# Patient Record
Sex: MALE | Race: WHITE | URBAN - METROPOLITAN AREA
[De-identification: names, ages, dates, MRNs, and addresses within clinical notes are randomized per-mention and may not be internally consistent; named-entity substitution may affect disease eponyms.]

---

## 2019-09-26 ENCOUNTER — OFFICE VISIT (OUTPATIENT)
Dept: INTERNAL MEDICINE CLINIC | Age: 39
End: 2019-09-26

## 2019-09-26 VITALS
WEIGHT: 209 LBS | HEART RATE: 70 BPM | SYSTOLIC BLOOD PRESSURE: 110 MMHG | RESPIRATION RATE: 18 BRPM | HEIGHT: 72 IN | BODY MASS INDEX: 28.31 KG/M2 | DIASTOLIC BLOOD PRESSURE: 75 MMHG

## 2019-09-26 DIAGNOSIS — K52.9 CHRONIC DIARRHEA: ICD-10-CM

## 2019-09-26 DIAGNOSIS — Z00.00 ANNUAL PHYSICAL EXAM: Primary | ICD-10-CM

## 2019-09-26 DIAGNOSIS — E78.00 HYPERCHOLESTEROLEMIA: ICD-10-CM

## 2019-09-26 PROCEDURE — 81002 URINALYSIS NONAUTO W/O SCOPE: CPT | Performed by: INTERNAL MEDICINE

## 2019-09-26 PROCEDURE — 99395 PREV VISIT EST AGE 18-39: CPT | Performed by: INTERNAL MEDICINE

## 2019-09-26 ASSESSMENT — ENCOUNTER SYMPTOMS
SORE THROAT: 0
EYE PAIN: 0
SHORTNESS OF BREATH: 0
CONSTIPATION: 0
NAUSEA: 0
ABDOMINAL PAIN: 0
DIARRHEA: 1
STRIDOR: 0
COUGH: 0

## 2019-09-26 ASSESSMENT — PATIENT HEALTH QUESTIONNAIRE - PHQ9
1. LITTLE INTEREST OR PLEASURE IN DOING THINGS: 0
SUM OF ALL RESPONSES TO PHQ9 QUESTIONS 1 & 2: 0
SUM OF ALL RESPONSES TO PHQ QUESTIONS 1-9: 0
SUM OF ALL RESPONSES TO PHQ QUESTIONS 1-9: 0
2. FEELING DOWN, DEPRESSED OR HOPELESS: 0

## 2019-09-26 NOTE — PROGRESS NOTES
Physically abused: Not on file     Forced sexual activity: Not on file   Other Topics Concern    Not on file   Social History Narrative    Not on file     Family History   Problem Relation Age of Onset    High Blood Pressure Father        Review of Systems   Constitutional: Negative for diaphoresis and fever. HENT: Negative for congestion, hearing loss, nosebleeds, sore throat and tinnitus. Eyes: Negative for pain. Respiratory: Negative for cough, shortness of breath and stridor. Cardiovascular: Negative for chest pain and palpitations. Gastrointestinal: Positive for diarrhea. Negative for abdominal pain, constipation and nausea. Endocrine: Negative for cold intolerance and heat intolerance. Genitourinary: Negative for dysuria and frequency. Musculoskeletal: Negative for arthralgias, myalgias and neck pain. Skin: Negative for rash. Allergic/Immunologic: Negative for environmental allergies. Neurological: Negative for tremors and headaches. Psychiatric/Behavioral: The patient is not nervous/anxious. All other systems reviewed and are negative. Objective:   Physical Exam   Vitals:    09/26/19 1636   BP: 110/75   Pulse: 70   Resp: 18         General: young healthy male,  Awake, alert and oriented. Appears to be not in any distress  Mucous Membranes:  Pink , anicteric  Mild hearing issues   HEENT - MM clear. TM normal. Pharynx clear  No Submandibular LN palpable  Neck: No JVD, no carotid bruit, no thyromegaly  Chest:  Clear to auscultation bilaterally, no added sounds  Cardiovascular:  RRR S1S2 heard, no murmurs or gallops  Abdomen:  Soft, undistended, non tender, no organomegaly, BS present  Extremities: No edema or cyanosis. Distal pulses well felt  Neurological : grossly normal CN 2 to 12 intact  Gait steady        Assessment:       Diagnosis Orders   1.  Annual physical exam  CBC WITH AUTO DIFFERENTIAL    COMPREHENSIVE METABOLIC PANEL    TSH with Reflex    Lipid, Fasting    POCT

## 2019-10-26 PROBLEM — Z00.00 ANNUAL PHYSICAL EXAM: Status: RESOLVED | Noted: 2019-09-26 | Resolved: 2019-10-26

## 2022-06-07 ENCOUNTER — NURSE ONLY (OUTPATIENT)
Dept: INTERNAL MEDICINE CLINIC | Age: 42
End: 2022-06-07

## 2022-06-07 DIAGNOSIS — Z00.00 ANNUAL PHYSICAL EXAM: Primary | ICD-10-CM

## 2022-06-07 LAB
A/G RATIO: 1.6 (ref 1.1–2.2)
ALBUMIN SERPL-MCNC: 4.6 G/DL (ref 3.4–5)
ALP BLD-CCNC: 67 U/L (ref 40–129)
ALT SERPL-CCNC: 18 U/L (ref 10–40)
ANION GAP SERPL CALCULATED.3IONS-SCNC: 13 MMOL/L (ref 3–16)
AST SERPL-CCNC: 20 U/L (ref 15–37)
BASOPHILS ABSOLUTE: 0.1 K/UL (ref 0–0.2)
BASOPHILS RELATIVE PERCENT: 1.1 %
BILIRUB SERPL-MCNC: 0.6 MG/DL (ref 0–1)
BILIRUBIN, POC: NORMAL
BLOOD URINE, POC: NORMAL
BUN BLDV-MCNC: 8 MG/DL (ref 7–20)
CALCIUM SERPL-MCNC: 9.6 MG/DL (ref 8.3–10.6)
CHLORIDE BLD-SCNC: 103 MMOL/L (ref 99–110)
CHOLESTEROL, FASTING: 219 MG/DL (ref 0–199)
CLARITY, POC: NORMAL
CO2: 25 MMOL/L (ref 21–32)
COLOR, POC: NORMAL
CREAT SERPL-MCNC: 0.9 MG/DL (ref 0.9–1.3)
EOSINOPHILS ABSOLUTE: 0.2 K/UL (ref 0–0.6)
EOSINOPHILS RELATIVE PERCENT: 3.9 %
GFR AFRICAN AMERICAN: >60
GFR NON-AFRICAN AMERICAN: >60
GLUCOSE BLD-MCNC: 99 MG/DL (ref 70–99)
GLUCOSE URINE, POC: NORMAL
HCT VFR BLD CALC: 42.7 % (ref 40.5–52.5)
HDLC SERPL-MCNC: 55 MG/DL (ref 40–60)
HEMOGLOBIN: 14.5 G/DL (ref 13.5–17.5)
IGA: 207 MG/DL (ref 70–400)
KETONES, POC: NORMAL
LDL CHOLESTEROL CALCULATED: 139 MG/DL
LEUKOCYTE EST, POC: NORMAL
LYMPHOCYTES ABSOLUTE: 1.8 K/UL (ref 1–5.1)
LYMPHOCYTES RELATIVE PERCENT: 32.6 %
MCH RBC QN AUTO: 31.1 PG (ref 26–34)
MCHC RBC AUTO-ENTMCNC: 33.9 G/DL (ref 31–36)
MCV RBC AUTO: 91.8 FL (ref 80–100)
MONOCYTES ABSOLUTE: 0.4 K/UL (ref 0–1.3)
MONOCYTES RELATIVE PERCENT: 7.4 %
NEUTROPHILS ABSOLUTE: 3 K/UL (ref 1.7–7.7)
NEUTROPHILS RELATIVE PERCENT: 55 %
NITRITE, POC: NORMAL
PDW BLD-RTO: 13.5 % (ref 12.4–15.4)
PH, POC: NORMAL
PLATELET # BLD: 222 K/UL (ref 135–450)
PMV BLD AUTO: 9.5 FL (ref 5–10.5)
POTASSIUM SERPL-SCNC: 4.4 MMOL/L (ref 3.5–5.1)
PROTEIN, POC: NORMAL
RBC # BLD: 4.65 M/UL (ref 4.2–5.9)
SODIUM BLD-SCNC: 141 MMOL/L (ref 136–145)
SPECIFIC GRAVITY, POC: NORMAL
TOTAL PROTEIN: 7.4 G/DL (ref 6.4–8.2)
TRIGLYCERIDE, FASTING: 127 MG/DL (ref 0–150)
TSH REFLEX: 1.19 UIU/ML (ref 0.27–4.2)
UROBILINOGEN, POC: NORMAL
VLDLC SERPL CALC-MCNC: 25 MG/DL
WBC # BLD: 5.4 K/UL (ref 4–11)

## 2022-06-07 PROCEDURE — 81002 URINALYSIS NONAUTO W/O SCOPE: CPT | Performed by: INTERNAL MEDICINE

## 2022-06-08 ENCOUNTER — OFFICE VISIT (OUTPATIENT)
Dept: INTERNAL MEDICINE CLINIC | Age: 42
End: 2022-06-08

## 2022-06-08 VITALS
WEIGHT: 206 LBS | BODY MASS INDEX: 27.9 KG/M2 | SYSTOLIC BLOOD PRESSURE: 120 MMHG | HEART RATE: 70 BPM | RESPIRATION RATE: 18 BRPM | DIASTOLIC BLOOD PRESSURE: 78 MMHG | HEIGHT: 72 IN

## 2022-06-08 DIAGNOSIS — K52.9 CHRONIC DIARRHEA: ICD-10-CM

## 2022-06-08 DIAGNOSIS — Z00.00 ANNUAL PHYSICAL EXAM: Primary | ICD-10-CM

## 2022-06-08 DIAGNOSIS — E78.00 HYPERCHOLESTEROLEMIA: ICD-10-CM

## 2022-06-08 LAB
TISSUE TRANSGLUTAMINASE IGA: <0.5 U/ML (ref 0–14)
URIC ACID, SERUM: 7.4 MG/DL (ref 3.5–7.2)

## 2022-06-08 PROCEDURE — 99396 PREV VISIT EST AGE 40-64: CPT | Performed by: INTERNAL MEDICINE

## 2022-06-08 RX ORDER — ATORVASTATIN CALCIUM 20 MG/1
20 TABLET, FILM COATED ORAL DAILY
Qty: 30 TABLET | Refills: 3 | Status: SHIPPED | OUTPATIENT
Start: 2022-06-08

## 2022-06-08 RX ORDER — DIPHENOXYLATE HYDROCHLORIDE AND ATROPINE SULFATE 2.5; .025 MG/1; MG/1
1 TABLET ORAL 4 TIMES DAILY PRN
Qty: 20 TABLET | Refills: 0 | Status: SHIPPED | OUTPATIENT
Start: 2022-06-08 | End: 2022-06-18

## 2022-06-08 ASSESSMENT — PATIENT HEALTH QUESTIONNAIRE - PHQ9
SUM OF ALL RESPONSES TO PHQ QUESTIONS 1-9: 0
1. LITTLE INTEREST OR PLEASURE IN DOING THINGS: 0
SUM OF ALL RESPONSES TO PHQ QUESTIONS 1-9: 0
SUM OF ALL RESPONSES TO PHQ9 QUESTIONS 1 & 2: 0
2. FEELING DOWN, DEPRESSED OR HOPELESS: 0
SUM OF ALL RESPONSES TO PHQ QUESTIONS 1-9: 0
SUM OF ALL RESPONSES TO PHQ QUESTIONS 1-9: 0

## 2022-06-08 ASSESSMENT — ENCOUNTER SYMPTOMS
DIARRHEA: 1
EYE PAIN: 0
ABDOMINAL PAIN: 0
SHORTNESS OF BREATH: 0
CONSTIPATION: 0
STRIDOR: 0
COUGH: 0
SORE THROAT: 0
NAUSEA: 0

## 2022-06-08 NOTE — PROGRESS NOTES
Subjective:      Patient ID: Digna Navarrete is a 43 y.o. male. HPI  43 y.o. male here for annual exam     Since last seen 3 yrs ago, pt has been doing ok with no major changes in health    Has hx of hearing def from working in loud environments and uses hearing aids    Hx of chronic diarrhea 2- 3 BM per day. Not related to any diet. Gets some abd pain with cramping followed by loose BM and feels unwell all day. These might happen sporadically. Takes lomotil with good relief. Symptoms >15 yrs . Never seen GI for this    No weight loss or BRPR. No skin rash or arthritis    Hx of hyperlipidemia not on any meds    Family hx  - father with CAD    Non smoker, lives with wife and kids    No past medical history on file. No past surgical history on file. Allergies   Allergen Reactions    Codeine Rash and Hives     Social History     Socioeconomic History    Marital status:      Spouse name: Not on file    Number of children: Not on file    Years of education: Not on file    Highest education level: Not on file   Occupational History    Not on file   Tobacco Use    Smoking status: Never Smoker    Smokeless tobacco: Never Used   Substance and Sexual Activity    Alcohol use: Yes     Comment: occas    Drug use: No    Sexual activity: Not on file   Other Topics Concern    Not on file   Social History Narrative    Not on file     Social Determinants of Health     Financial Resource Strain:     Difficulty of Paying Living Expenses: Not on file   Food Insecurity:     Worried About Running Out of Food in the Last Year: Not on file    Teresita of Food in the Last Year: Not on file   Transportation Needs:     Lack of Transportation (Medical): Not on file    Lack of Transportation (Non-Medical):  Not on file   Physical Activity:     Days of Exercise per Week: Not on file    Minutes of Exercise per Session: Not on file   Stress:     Feeling of Stress : Not on file   Social Connections:     Frequency of Communication with Friends and Family: Not on file    Frequency of Social Gatherings with Friends and Family: Not on file    Attends Buddhist Services: Not on file    Active Member of Clubs or Organizations: Not on file    Attends Club or Organization Meetings: Not on file    Marital Status: Not on file   Intimate Partner Violence:     Fear of Current or Ex-Partner: Not on file    Emotionally Abused: Not on file    Physically Abused: Not on file    Sexually Abused: Not on file   Housing Stability:     Unable to Pay for Housing in the Last Year: Not on file    Number of [place] in the Last Year: Not on file    Unstable Housing in the Last Year: Not on file     Family History   Problem Relation Age of Onset    High Blood Pressure Father        Review of Systems   Constitutional: Negative for diaphoresis and fever. HENT: Negative for congestion, hearing loss, nosebleeds, sore throat and tinnitus. Eyes: Negative for pain. Respiratory: Negative for cough, shortness of breath and stridor. Cardiovascular: Negative for chest pain and palpitations. Gastrointestinal: Positive for diarrhea. Negative for abdominal pain, constipation and nausea. Endocrine: Negative for cold intolerance and heat intolerance. Genitourinary: Negative for dysuria and frequency. Musculoskeletal: Negative for arthralgias, myalgias and neck pain. Skin: Negative for rash. Allergic/Immunologic: Negative for environmental allergies. Neurological: Negative for tremors and headaches. Psychiatric/Behavioral: The patient is not nervous/anxious. All other systems reviewed and are negative. Objective:   Physical Exam   Vitals:    06/08/22 1129   BP: 120/78   Pulse: 70   Resp: 18         General: young healthy male,  Awake, alert and oriented. Appears to be not in any distress  Mucous Membranes:  Pink , anicteric  Mild hearing issues   HEENT - MM clear.  TM normal.   No Submandibular LN palpable  Neck: No JVD, no carotid bruit, no thyromegaly  Chest:  Clear to auscultation bilaterally, no added sounds  Cardiovascular:  RRR S1S2 heard, no murmurs or gallops  Abdomen:  Soft, undistended, non tender, no organomegaly, BS present  Extremities: No edema or cyanosis. Distal pulses well felt  Penis and scrotum wnl  Neurological : grossly normal CN 2 to 12 intact  Gait steady        Assessment:       Diagnosis Orders   1. Annual physical exam     2. Hypercholesterolemia     3. Chronic diarrhea  diphenoxylate-atropine (DIPHENATOL) 2.5-0.025 MG per tablet           Plan:      Annual labs reviewed with pt     Chronic diarrhea - stool tests and Ct abd neg long time ago. Usually improves with lomotil  Consider GI consult.  But no warning signs or evidence of systemic disease so far      Hearing loss - has hearing aids    Hyperlipidemia - need meds and good control  given famly hx of CAD  Recommend to start lipitor and repeat labs in6 months      covid vaccine recommended    weight loss and life style modification along with dietary changes, increased physical activity encouraged            Christiano Bell MD

## 2022-10-27 ENCOUNTER — HOSPITAL ENCOUNTER (EMERGENCY)
Age: 42
Discharge: HOME | End: 2022-10-27
Attending: STUDENT IN AN ORGANIZED HEALTH CARE EDUCATION/TRAINING PROGRAM
Payer: COMMERCIAL

## 2022-10-27 ENCOUNTER — APPOINTMENT (OUTPATIENT)
Dept: CT IMAGING | Age: 42
End: 2022-10-27
Payer: COMMERCIAL

## 2022-10-27 VITALS
DIASTOLIC BLOOD PRESSURE: 75 MMHG | SYSTOLIC BLOOD PRESSURE: 123 MMHG | WEIGHT: 204 LBS | BODY MASS INDEX: 27.63 KG/M2 | OXYGEN SATURATION: 98 % | RESPIRATION RATE: 16 BRPM | HEART RATE: 68 BPM | TEMPERATURE: 98.1 F | HEIGHT: 72 IN

## 2022-10-27 DIAGNOSIS — R31.9 HEMATURIA, UNSPECIFIED TYPE: ICD-10-CM

## 2022-10-27 DIAGNOSIS — R10.9 FLANK PAIN: ICD-10-CM

## 2022-10-27 DIAGNOSIS — N20.0 KIDNEY STONE: Primary | ICD-10-CM

## 2022-10-27 LAB
A/G RATIO: 1.5 (ref 1.1–2.2)
ALBUMIN SERPL-MCNC: 4.5 G/DL (ref 3.4–5)
ALP BLD-CCNC: 69 U/L (ref 40–129)
ALT SERPL-CCNC: 30 U/L (ref 10–40)
ANION GAP SERPL CALCULATED.3IONS-SCNC: 11 MMOL/L (ref 3–16)
AST SERPL-CCNC: 30 U/L (ref 15–37)
BASOPHILS ABSOLUTE: 0 K/UL (ref 0–0.2)
BASOPHILS RELATIVE PERCENT: 0 %
BILIRUB SERPL-MCNC: 0.3 MG/DL (ref 0–1)
BUN BLDV-MCNC: 13 MG/DL (ref 7–20)
CALCIUM SERPL-MCNC: 9.3 MG/DL (ref 8.3–10.6)
CHLORIDE BLD-SCNC: 101 MMOL/L (ref 99–110)
CO2: 26 MMOL/L (ref 21–32)
CREAT SERPL-MCNC: 1 MG/DL (ref 0.9–1.3)
EOSINOPHILS ABSOLUTE: 0.1 K/UL (ref 0–0.6)
EOSINOPHILS RELATIVE PERCENT: 1 %
GFR SERPL CREATININE-BSD FRML MDRD: >60 ML/MIN/{1.73_M2}
GLUCOSE BLD-MCNC: 175 MG/DL (ref 70–99)
HCT VFR BLD CALC: 41.5 % (ref 40.5–52.5)
HEMOGLOBIN: 13.7 G/DL (ref 13.5–17.5)
LIPASE: 27 U/L (ref 13–60)
LYMPHOCYTES ABSOLUTE: 3.6 K/UL (ref 1–5.1)
LYMPHOCYTES RELATIVE PERCENT: 31 %
MCH RBC QN AUTO: 30.2 PG (ref 26–34)
MCHC RBC AUTO-ENTMCNC: 32.9 G/DL (ref 31–36)
MCV RBC AUTO: 91.8 FL (ref 80–100)
MONOCYTES ABSOLUTE: 0.4 K/UL (ref 0–1.3)
MONOCYTES RELATIVE PERCENT: 3 %
NEUTROPHILS ABSOLUTE: 7.6 K/UL (ref 1.7–7.7)
NEUTROPHILS RELATIVE PERCENT: 65 %
PDW BLD-RTO: 13.1 % (ref 12.4–15.4)
PLATELET # BLD: 235 K/UL (ref 135–450)
PLATELET SLIDE REVIEW: ADEQUATE
PMV BLD AUTO: 8.8 FL (ref 5–10.5)
POTASSIUM REFLEX MAGNESIUM: 4.1 MMOL/L (ref 3.5–5.1)
RBC # BLD: 4.52 M/UL (ref 4.2–5.9)
RBC # BLD: NORMAL 10*6/UL
SLIDE REVIEW: ABNORMAL
SODIUM BLD-SCNC: 138 MMOL/L (ref 136–145)
TOTAL PROTEIN: 7.5 G/DL (ref 6.4–8.2)
WBC # BLD: 11.7 K/UL (ref 4–11)

## 2022-10-27 PROCEDURE — 80053 COMPREHEN METABOLIC PANEL: CPT

## 2022-10-27 PROCEDURE — 83690 ASSAY OF LIPASE: CPT

## 2022-10-27 PROCEDURE — 96374 THER/PROPH/DIAG INJ IV PUSH: CPT

## 2022-10-27 PROCEDURE — 99285 EMERGENCY DEPT VISIT HI MDM: CPT

## 2022-10-27 PROCEDURE — 6360000002 HC RX W HCPCS: Performed by: STUDENT IN AN ORGANIZED HEALTH CARE EDUCATION/TRAINING PROGRAM

## 2022-10-27 PROCEDURE — 74178 CT ABD&PLV WO CNTR FLWD CNTR: CPT

## 2022-10-27 PROCEDURE — 96376 TX/PRO/DX INJ SAME DRUG ADON: CPT

## 2022-10-27 PROCEDURE — 6360000004 HC RX CONTRAST MEDICATION: Performed by: STUDENT IN AN ORGANIZED HEALTH CARE EDUCATION/TRAINING PROGRAM

## 2022-10-27 PROCEDURE — 85025 COMPLETE CBC W/AUTO DIFF WBC: CPT

## 2022-10-27 RX ORDER — ONDANSETRON 2 MG/ML
4 INJECTION INTRAMUSCULAR; INTRAVENOUS ONCE
Status: DISCONTINUED | OUTPATIENT
Start: 2022-10-27 | End: 2022-10-27 | Stop reason: HOSPADM

## 2022-10-27 RX ORDER — KETOROLAC TROMETHAMINE 30 MG/ML
15 INJECTION, SOLUTION INTRAMUSCULAR; INTRAVENOUS ONCE
Status: COMPLETED | OUTPATIENT
Start: 2022-10-27 | End: 2022-10-27

## 2022-10-27 RX ORDER — ONDANSETRON 4 MG/1
4 TABLET, ORALLY DISINTEGRATING ORAL EVERY 8 HOURS PRN
Qty: 12 TABLET | Refills: 0 | Status: SHIPPED | OUTPATIENT
Start: 2022-10-27 | End: 2022-11-21 | Stop reason: ALTCHOICE

## 2022-10-27 RX ORDER — FENTANYL CITRATE 50 UG/ML
25 INJECTION, SOLUTION INTRAMUSCULAR; INTRAVENOUS ONCE
Status: DISCONTINUED | OUTPATIENT
Start: 2022-10-27 | End: 2022-10-27

## 2022-10-27 RX ORDER — KETOROLAC TROMETHAMINE 10 MG/1
10 TABLET, FILM COATED ORAL EVERY 6 HOURS PRN
Qty: 12 TABLET | Refills: 0 | Status: SHIPPED | OUTPATIENT
Start: 2022-10-27 | End: 2022-11-21 | Stop reason: ALTCHOICE

## 2022-10-27 RX ORDER — TAMSULOSIN HYDROCHLORIDE 0.4 MG/1
0.4 CAPSULE ORAL DAILY
Qty: 5 CAPSULE | Refills: 0 | Status: SHIPPED | OUTPATIENT
Start: 2022-10-27 | End: 2022-11-21 | Stop reason: ALTCHOICE

## 2022-10-27 RX ADMIN — KETOROLAC TROMETHAMINE 15 MG: 30 INJECTION, SOLUTION INTRAMUSCULAR at 01:59

## 2022-10-27 RX ADMIN — KETOROLAC TROMETHAMINE 15 MG: 30 INJECTION, SOLUTION INTRAMUSCULAR at 03:39

## 2022-10-27 RX ADMIN — IOPAMIDOL 75 ML: 755 INJECTION, SOLUTION INTRAVENOUS at 02:46

## 2022-10-27 SDOH — ECONOMIC STABILITY: FOOD INSECURITY: WITHIN THE PAST 12 MONTHS, THE FOOD YOU BOUGHT JUST DIDN'T LAST AND YOU DIDN'T HAVE MONEY TO GET MORE.: NEVER TRUE

## 2022-10-27 ASSESSMENT — PAIN SCALES - GENERAL
PAINLEVEL_OUTOF10: 10
PAINLEVEL_OUTOF10: 10
PAINLEVEL_OUTOF10: 2
PAINLEVEL_OUTOF10: 10

## 2022-10-27 ASSESSMENT — PAIN DESCRIPTION - ORIENTATION
ORIENTATION: RIGHT;LOWER
ORIENTATION: RIGHT

## 2022-10-27 ASSESSMENT — PAIN DESCRIPTION - LOCATION
LOCATION: FLANK;ABDOMEN
LOCATION: ABDOMEN
LOCATION: ABDOMEN

## 2022-10-27 ASSESSMENT — PAIN DESCRIPTION - DESCRIPTORS: DESCRIPTORS: ACHING

## 2022-10-27 ASSESSMENT — PAIN - FUNCTIONAL ASSESSMENT: PAIN_FUNCTIONAL_ASSESSMENT: 0-10

## 2022-10-27 NOTE — DISCHARGE INSTRUCTIONS
You were seen in the emergency department for flank pain. Your CT scan did show a 3 mm kidney stone which is likely the source of your pain. Given the size of the kidney stone will likely pass on its own. I prescribed you 3 new medications. Please take as directed. You can return to the emergency department at anytime with any new or concerning changes to your health.

## 2022-10-27 NOTE — ED PROVIDER NOTES
ATTENDING PHYSICIAN NOTE       Date of evaluation: 10/27/2022    Chief Complaint     Abdominal Pain (Severe pain in right flank and abdomen. Started in the back and now in RLQ)      History of Present Illness     Brian Cavazos is a 43 y.o. male who presents right-sided flank pain and right lower quadrant abdominal pain. Reports that he has had right-sided back pain for the last week however woke up around 11 PM last night with severe 10 out of 10 pain. Denies prior episodes. Reports some nausea and multiple episodes of nonbilious nonbloody vomiting. Denies prior episodes. Reports hematuria but denies any dysuria or change in urinary frequency. Denies any fever, congestion, sore throat, visual changes, cough, shortness of breath, chest pain, palpitations, bloody stool, testicular pain, neck stiffness, syncope or unilateral weakness. He did not take anything for symptoms. Denies prior abdominal surgeries. Review of Systems     Review of Systems   All other systems reviewed and are negative. Past Medical, Surgical, Family, and Social History     He has no past medical history on file. He has no past surgical history on file. His family history includes High Blood Pressure in his father. He reports that he has never smoked. He has never used smokeless tobacco. He reports current alcohol use. He reports that he does not use drugs. Medications     Previous Medications    ATORVASTATIN (LIPITOR) 20 MG TABLET    Take 1 tablet by mouth daily       Allergies     He is allergic to codeine. Physical Exam     INITIAL VITALS: BP: (!) 149/95, Temp: 97.7 °F (36.5 °C), Heart Rate: 51, Resp: 16, SpO2: 100 %   Physical Exam  Vitals and nursing note reviewed. Constitutional:       Appearance: He is toxic-appearing. HENT:      Head: Normocephalic and atraumatic. Mouth/Throat:      Pharynx: Oropharynx is clear. No pharyngeal swelling or oropharyngeal exudate.    Eyes:      General: No scleral icterus. Cardiovascular:      Rate and Rhythm: Normal rate and regular rhythm. Heart sounds: Normal heart sounds. No murmur heard. No friction rub. Pulmonary:      Effort: Pulmonary effort is normal. No respiratory distress. Breath sounds: Normal breath sounds. No wheezing. Abdominal:      General: Abdomen is flat. Bowel sounds are normal.      Palpations: Abdomen is soft. There is no hepatomegaly or splenomegaly. Tenderness: There is abdominal tenderness in the right lower quadrant. There is right CVA tenderness. There is no left CVA tenderness, guarding or rebound. Negative signs include Waters's sign and Rovsing's sign. Hernia: No hernia is present. Skin:     General: Skin is warm. Capillary Refill: Capillary refill takes less than 2 seconds. Coloration: Skin is not cyanotic or jaundiced. Neurological:      General: No focal deficit present. Mental Status: He is alert. Cranial Nerves: No cranial nerve deficit. Motor: No weakness. Psychiatric:         Mood and Affect: Mood normal.         Behavior: Behavior normal.       Diagnostic Results       RADIOLOGY:  CT ABDOMEN PELVIS W WO CONTRAST Additional Contrast? None   Final Result   There is mild right hydronephrosis and proximal hydroureter due to a 3 mm   calculus in the mid right ureter. Nonobstructing calculus in the lower pole left kidney without left-sided   hydronephrosis or hydroureter. No appendicitis, bowel obstruction, or pneumoperitoneum.              LABS:   Results for orders placed or performed during the hospital encounter of 10/27/22   CBC with Auto Differential   Result Value Ref Range    WBC 11.7 (H) 4.0 - 11.0 K/uL    RBC 4.52 4.20 - 5.90 M/uL    Hemoglobin 13.7 13.5 - 17.5 g/dL    Hematocrit 41.5 40.5 - 52.5 %    MCV 91.8 80.0 - 100.0 fL    MCH 30.2 26.0 - 34.0 pg    MCHC 32.9 31.0 - 36.0 g/dL    RDW 13.1 12.4 - 15.4 %    Platelets 212 740 - 015 K/uL    MPV 8.8 5.0 - 10.5 fL PLATELET SLIDE REVIEW Adequate     SLIDE REVIEW see below     Neutrophils % 65.0 %    Lymphocytes % 31.0 %    Monocytes % 3.0 %    Eosinophils % 1.0 %    Basophils % 0.0 %    Neutrophils Absolute 7.6 1.7 - 7.7 K/uL    Lymphocytes Absolute 3.6 1.0 - 5.1 K/uL    Monocytes Absolute 0.4 0.0 - 1.3 K/uL    Eosinophils Absolute 0.1 0.0 - 0.6 K/uL    Basophils Absolute 0.0 0.0 - 0.2 K/uL    RBC Morphology Normal    CMP w/ Reflex to MG   Result Value Ref Range    Sodium 138 136 - 145 mmol/L    Potassium reflex Magnesium 4.1 3.5 - 5.1 mmol/L    Chloride 101 99 - 110 mmol/L    CO2 26 21 - 32 mmol/L    Anion Gap 11 3 - 16    Glucose 175 (H) 70 - 99 mg/dL    BUN 13 7 - 20 mg/dL    Creatinine 1.0 0.9 - 1.3 mg/dL    Est, Glom Filt Rate >60 >60    Calcium 9.3 8.3 - 10.6 mg/dL    Total Protein 7.5 6.4 - 8.2 g/dL    Albumin 4.5 3.4 - 5.0 g/dL    Albumin/Globulin Ratio 1.5 1.1 - 2.2    Total Bilirubin 0.3 0.0 - 1.0 mg/dL    Alkaline Phosphatase 69 40 - 129 U/L    ALT 30 10 - 40 U/L    AST 30 15 - 37 U/L   Lipase   Result Value Ref Range    Lipase 27.0 13.0 - 60.0 U/L       ED BEDSIDE ULTRASOUND:  No results found. RECENT VITALS:  BP: 129/71,Temp: 97.7 °F (36.5 °C), Heart Rate: 57, Resp: (!) 9, SpO2: 100 %     Procedures         ED Course     Nursing Notes, Past Medical Hx, Past Surgical Hx, Social Hx,Allergies, and Family Hx were reviewed.          patient was given the following medications:  Orders Placed This Encounter   Medications    ketorolac (TORADOL) injection 15 mg    iopamidol (ISOVUE-370) 76 % injection 75 mL    DISCONTD: fentaNYL (SUBLIMAZE) injection 25 mcg    ondansetron (ZOFRAN) injection 4 mg    ketorolac (TORADOL) injection 15 mg    ketorolac (TORADOL) 10 MG tablet     Sig: Take 1 tablet by mouth every 6 hours as needed for Pain     Dispense:  12 tablet     Refill:  0    ondansetron (ZOFRAN ODT) 4 MG disintegrating tablet     Sig: Take 1 tablet by mouth every 8 hours as needed for Nausea     Dispense:  12 tablet Refill:  0    tamsulosin (FLOMAX) 0.4 MG capsule     Sig: Take 1 capsule by mouth daily for 5 doses     Dispense:  5 capsule     Refill:  0       CONSULTS:  None    MEDICAL DECISIONMAKING / ASSESSMENT / PLAN     Parker Washington is a 43 y.o. male who presents with right-sided flank pain and right lower quadrant abdominal pain. Patient presented hypertensive, afebrile, heart rate of 55, respiratory rate of 12 and satting at 98% on room air. Again on exam he had right-sided CVA tenderness and right lower quadrant abdominal tenderness. Given history and exam I am concerned for underlying nephrolithiasis, septic stone, appendicitis, urinary tract infection, pyelonephritis. Also considered underlying diverticulitis. I obtained labs and imaging studies as noted below    I interpreted the labs and note  CBC with leukocytosis to 11.7, no evidence of anemia, normal platelets  CMP with normal electrolytes, hyperglycemic to 175, normal LFTs and bilirubin  UA pending    I interpreted the images studies and note  CT abdomen pelvis with and without IV contrast: Mild right hydronephrosis and proximal hydroureter due to a 3 mm calculus in the right ureter. Also has a nonobstructing calculus in the lower pole of his left kidney with no hydronephrosis. No additional acute findings at this time. At this point in time patient would like to go home. UA was not obtained. He has no dysuria, no fever so low suspicion for septic stone at this time. He was advised to return with any fever or worsening flank pain. On reassessment patient reports initial improvement after Toradol helped however after being administered contrast right-sided flank pain returned. He was given an additional 15 mg of Toradol with relief. Given the above findings we will discharge patient home. All questions and concerns were addressed. He remained hemodynamically stable. Strict return precautions reviewed. Clinical Impression     1.  Kidney stone    2. Flank pain    3.  Hematuria, unspecified type        Disposition     PATIENT REFERRED TO:  Pascual Tirado MD  [id number]          DISCHARGE MEDICATIONS:  New Prescriptions    KETOROLAC (TORADOL) 10 MG TABLET    Take 1 tablet by mouth every 6 hours as needed for Pain    ONDANSETRON (ZOFRAN ODT) 4 MG DISINTEGRATING TABLET    Take 1 tablet by mouth every 8 hours as needed for Nausea    TAMSULOSIN (FLOMAX) 0.4 MG CAPSULE    Take 1 capsule by mouth daily for 5 doses       DISPOSITION Decision To Discharge 10/27/2022 04:01:39 AM         Kiley De La Cruz MD  10/27/22 0410

## 2022-10-30 RX ORDER — KETOROLAC TROMETHAMINE 10 MG/1
10 TABLET, FILM COATED ORAL EVERY 6 HOURS PRN
Qty: 10 TABLET | Refills: 0 | Status: SHIPPED | OUTPATIENT
Start: 2022-10-30 | End: 2022-11-21 | Stop reason: ALTCHOICE

## 2022-10-31 ENCOUNTER — TELEPHONE (OUTPATIENT)
Dept: INTERNAL MEDICINE CLINIC | Age: 42
End: 2022-10-31

## 2022-10-31 ENCOUNTER — HOSPITAL ENCOUNTER (INPATIENT)
Age: 42
LOS: 1 days | Discharge: HOME | DRG: 661 | End: 2022-11-01
Attending: EMERGENCY MEDICINE | Admitting: INTERNAL MEDICINE
Payer: COMMERCIAL

## 2022-10-31 DIAGNOSIS — N20.1 URETERIC STONE: Primary | ICD-10-CM

## 2022-10-31 DIAGNOSIS — N20.1 RIGHT URETERAL STONE: ICD-10-CM

## 2022-10-31 LAB
A/G RATIO: 1.1 (ref 1.1–2.2)
ALBUMIN SERPL-MCNC: 3.9 G/DL (ref 3.4–5)
ALP BLD-CCNC: 78 U/L (ref 40–129)
ALT SERPL-CCNC: 25 U/L (ref 10–40)
ANION GAP SERPL CALCULATED.3IONS-SCNC: 11 MMOL/L (ref 3–16)
AST SERPL-CCNC: 41 U/L (ref 15–37)
BASOPHILS ABSOLUTE: 0 K/UL (ref 0–0.2)
BASOPHILS RELATIVE PERCENT: 0.4 %
BILIRUB SERPL-MCNC: 0.6 MG/DL (ref 0–1)
BILIRUBIN URINE: NEGATIVE
BLOOD, URINE: ABNORMAL
BUN BLDV-MCNC: 11 MG/DL (ref 7–20)
CALCIUM SERPL-MCNC: 8.8 MG/DL (ref 8.3–10.6)
CHLORIDE BLD-SCNC: 100 MMOL/L (ref 99–110)
CLARITY: CLEAR
CO2: 25 MMOL/L (ref 21–32)
COLOR: YELLOW
CREAT SERPL-MCNC: 1.1 MG/DL (ref 0.9–1.3)
EOSINOPHILS ABSOLUTE: 0.2 K/UL (ref 0–0.6)
EOSINOPHILS RELATIVE PERCENT: 2.2 %
EPITHELIAL CELLS, UA: ABNORMAL /HPF (ref 0–5)
GFR SERPL CREATININE-BSD FRML MDRD: >60 ML/MIN/{1.73_M2}
GLUCOSE BLD-MCNC: 106 MG/DL (ref 70–99)
GLUCOSE URINE: NEGATIVE MG/DL
HCT VFR BLD CALC: 37.7 % (ref 40.5–52.5)
HEMOGLOBIN: 12.9 G/DL (ref 13.5–17.5)
KETONES, URINE: NEGATIVE MG/DL
LACTIC ACID: 0.9 MMOL/L (ref 0.4–2)
LEUKOCYTE ESTERASE, URINE: NEGATIVE
LIPASE: 30 U/L (ref 13–60)
LYMPHOCYTES ABSOLUTE: 1.4 K/UL (ref 1–5.1)
LYMPHOCYTES RELATIVE PERCENT: 13.5 %
MCH RBC QN AUTO: 31.4 PG (ref 26–34)
MCHC RBC AUTO-ENTMCNC: 34.3 G/DL (ref 31–36)
MCV RBC AUTO: 91.5 FL (ref 80–100)
MICROSCOPIC EXAMINATION: YES
MONOCYTES ABSOLUTE: 1 K/UL (ref 0–1.3)
MONOCYTES RELATIVE PERCENT: 9.6 %
NEUTROPHILS ABSOLUTE: 7.9 K/UL (ref 1.7–7.7)
NEUTROPHILS RELATIVE PERCENT: 74.3 %
NITRITE, URINE: NEGATIVE
PDW BLD-RTO: 12.9 % (ref 12.4–15.4)
PH UA: 6 (ref 5–8)
PLATELET # BLD: 218 K/UL (ref 135–450)
PMV BLD AUTO: 9.1 FL (ref 5–10.5)
POTASSIUM SERPL-SCNC: 5.2 MMOL/L (ref 3.5–5.1)
PROTEIN UA: NEGATIVE MG/DL
RBC # BLD: 4.12 M/UL (ref 4.2–5.9)
RBC UA: ABNORMAL /HPF (ref 0–4)
SODIUM BLD-SCNC: 136 MMOL/L (ref 136–145)
SPECIFIC GRAVITY UA: 1.01 (ref 1–1.03)
TOTAL PROTEIN: 7.5 G/DL (ref 6.4–8.2)
URINE REFLEX TO CULTURE: ABNORMAL
URINE TYPE: ABNORMAL
UROBILINOGEN, URINE: 0.2 E.U./DL
WBC # BLD: 10.6 K/UL (ref 4–11)
WBC UA: ABNORMAL /HPF (ref 0–5)

## 2022-10-31 PROCEDURE — 96374 THER/PROPH/DIAG INJ IV PUSH: CPT

## 2022-10-31 PROCEDURE — 6360000002 HC RX W HCPCS: Performed by: EMERGENCY MEDICINE

## 2022-10-31 PROCEDURE — 96361 HYDRATE IV INFUSION ADD-ON: CPT

## 2022-10-31 PROCEDURE — 85025 COMPLETE CBC W/AUTO DIFF WBC: CPT

## 2022-10-31 PROCEDURE — 2580000003 HC RX 258: Performed by: EMERGENCY MEDICINE

## 2022-10-31 PROCEDURE — 83690 ASSAY OF LIPASE: CPT

## 2022-10-31 PROCEDURE — 6370000000 HC RX 637 (ALT 250 FOR IP): Performed by: EMERGENCY MEDICINE

## 2022-10-31 PROCEDURE — 80053 COMPREHEN METABOLIC PANEL: CPT

## 2022-10-31 PROCEDURE — 83605 ASSAY OF LACTIC ACID: CPT

## 2022-10-31 PROCEDURE — 99285 EMERGENCY DEPT VISIT HI MDM: CPT

## 2022-10-31 PROCEDURE — 81001 URINALYSIS AUTO W/SCOPE: CPT

## 2022-10-31 RX ORDER — MORPHINE SULFATE 4 MG/ML
4 INJECTION, SOLUTION INTRAMUSCULAR; INTRAVENOUS
Status: DISCONTINUED | OUTPATIENT
Start: 2022-10-31 | End: 2022-11-01 | Stop reason: SDUPTHER

## 2022-10-31 RX ORDER — TAMSULOSIN HYDROCHLORIDE 0.4 MG/1
0.4 CAPSULE ORAL ONCE
Status: COMPLETED | OUTPATIENT
Start: 2022-10-31 | End: 2022-10-31

## 2022-10-31 RX ORDER — 0.9 % SODIUM CHLORIDE 0.9 %
1000 INTRAVENOUS SOLUTION INTRAVENOUS ONCE
Status: COMPLETED | OUTPATIENT
Start: 2022-10-31 | End: 2022-11-01

## 2022-10-31 RX ADMIN — MORPHINE SULFATE 4 MG: 4 INJECTION, SOLUTION INTRAMUSCULAR; INTRAVENOUS at 23:14

## 2022-10-31 RX ADMIN — TAMSULOSIN HYDROCHLORIDE 0.4 MG: 0.4 CAPSULE ORAL at 23:13

## 2022-10-31 RX ADMIN — SODIUM CHLORIDE 1000 ML: 9 INJECTION, SOLUTION INTRAVENOUS at 23:12

## 2022-10-31 ASSESSMENT — PAIN DESCRIPTION - ORIENTATION: ORIENTATION: RIGHT

## 2022-10-31 ASSESSMENT — PAIN SCALES - GENERAL: PAINLEVEL_OUTOF10: 10

## 2022-10-31 ASSESSMENT — PAIN DESCRIPTION - DESCRIPTORS: DESCRIPTORS: ACHING;STABBING

## 2022-10-31 ASSESSMENT — PAIN DESCRIPTION - LOCATION: LOCATION: FLANK

## 2022-10-31 NOTE — TELEPHONE ENCOUNTER
----- Message from Emma Ferraro MD sent at 10/31/2022  4:04 PM EDT -----  Contact: Momo Smith [phone]  Likely has obstruction with stone   I have seen the ct  Might need urinary stent placement to relieve pressure. Should go back to ER for admission     ----- Message -----  From: Darius Solano  Sent: 10/31/2022   2:30 PM EDT  To: Emma Ferraro MD    Patient states he had a CT done and found kidney stones, wanting to know if he needs xray? No fever but in severe pain  ----- Message -----  From: Emma Ferraro MD  Sent: 10/31/2022   2:01 PM EDT  To: Scot Madera    Obtain KUB xray   Any fevers    ----- Message -----  From: Agustin Leija  Sent: 10/31/2022   8:59 AM EDT  To: Emma Ferraro MD    Patient was seen in ER on 10/26/22 for kidney stones. He is still in severe pain and has not passed anything as of this morning. He was told to follow up with PCP.      Thank you

## 2022-11-01 ENCOUNTER — ANESTHESIA (OUTPATIENT)
Dept: OPERATING ROOM | Age: 42
DRG: 661 | End: 2022-11-01
Payer: COMMERCIAL

## 2022-11-01 ENCOUNTER — TELEPHONE (OUTPATIENT)
Dept: INTERNAL MEDICINE CLINIC | Age: 42
End: 2022-11-01

## 2022-11-01 ENCOUNTER — APPOINTMENT (OUTPATIENT)
Dept: GENERAL RADIOLOGY | Age: 42
DRG: 661 | End: 2022-11-01
Payer: COMMERCIAL

## 2022-11-01 ENCOUNTER — ANESTHESIA EVENT (OUTPATIENT)
Dept: OPERATING ROOM | Age: 42
DRG: 661 | End: 2022-11-01
Payer: COMMERCIAL

## 2022-11-01 VITALS
DIASTOLIC BLOOD PRESSURE: 92 MMHG | TEMPERATURE: 98 F | HEART RATE: 63 BPM | OXYGEN SATURATION: 95 % | HEIGHT: 72 IN | SYSTOLIC BLOOD PRESSURE: 162 MMHG | WEIGHT: 206.35 LBS | BODY MASS INDEX: 27.95 KG/M2 | RESPIRATION RATE: 16 BRPM

## 2022-11-01 PROBLEM — N13.9 OBSTRUCTIVE UROPATHY: Status: ACTIVE | Noted: 2022-11-01

## 2022-11-01 LAB
ANION GAP SERPL CALCULATED.3IONS-SCNC: 13 MMOL/L (ref 3–16)
BUN BLDV-MCNC: 11 MG/DL (ref 7–20)
CALCIUM SERPL-MCNC: 9.1 MG/DL (ref 8.3–10.6)
CHLORIDE BLD-SCNC: 100 MMOL/L (ref 99–110)
CO2: 26 MMOL/L (ref 21–32)
CREAT SERPL-MCNC: 1.2 MG/DL (ref 0.9–1.3)
GFR SERPL CREATININE-BSD FRML MDRD: >60 ML/MIN/{1.73_M2}
GLUCOSE BLD-MCNC: 111 MG/DL (ref 70–99)
POTASSIUM SERPL-SCNC: 4.2 MMOL/L (ref 3.5–5.1)
SODIUM BLD-SCNC: 139 MMOL/L (ref 136–145)

## 2022-11-01 PROCEDURE — G0378 HOSPITAL OBSERVATION PER HR: HCPCS

## 2022-11-01 PROCEDURE — 3700000000 HC ANESTHESIA ATTENDED CARE: Performed by: UROLOGY

## 2022-11-01 PROCEDURE — 6360000002 HC RX W HCPCS: Performed by: EMERGENCY MEDICINE

## 2022-11-01 PROCEDURE — 2580000003 HC RX 258: Performed by: INTERNAL MEDICINE

## 2022-11-01 PROCEDURE — 3700000001 HC ADD 15 MINUTES (ANESTHESIA): Performed by: UROLOGY

## 2022-11-01 PROCEDURE — 2580000003 HC RX 258: Performed by: NURSE ANESTHETIST, CERTIFIED REGISTERED

## 2022-11-01 PROCEDURE — 1200000000 HC SEMI PRIVATE

## 2022-11-01 PROCEDURE — 6360000002 HC RX W HCPCS: Performed by: NURSE ANESTHETIST, CERTIFIED REGISTERED

## 2022-11-01 PROCEDURE — 82365 CALCULUS SPECTROSCOPY: CPT

## 2022-11-01 PROCEDURE — 96376 TX/PRO/DX INJ SAME DRUG ADON: CPT

## 2022-11-01 PROCEDURE — 7100000000 HC PACU RECOVERY - FIRST 15 MIN: Performed by: UROLOGY

## 2022-11-01 PROCEDURE — 2500000003 HC RX 250 WO HCPCS: Performed by: NURSE ANESTHETIST, CERTIFIED REGISTERED

## 2022-11-01 PROCEDURE — C2617 STENT, NON-COR, TEM W/O DEL: HCPCS | Performed by: UROLOGY

## 2022-11-01 PROCEDURE — 3209999900 FLUORO FOR SURGICAL PROCEDURES

## 2022-11-01 PROCEDURE — 6360000002 HC RX W HCPCS: Performed by: INTERNAL MEDICINE

## 2022-11-01 PROCEDURE — 2580000003 HC RX 258: Performed by: UROLOGY

## 2022-11-01 PROCEDURE — 3600000014 HC SURGERY LEVEL 4 ADDTL 15MIN: Performed by: UROLOGY

## 2022-11-01 PROCEDURE — 2709999900 HC NON-CHARGEABLE SUPPLY: Performed by: UROLOGY

## 2022-11-01 PROCEDURE — 7100000001 HC PACU RECOVERY - ADDTL 15 MIN: Performed by: UROLOGY

## 2022-11-01 PROCEDURE — 2720000010 HC SURG SUPPLY STERILE: Performed by: UROLOGY

## 2022-11-01 PROCEDURE — 0T768DZ DILATION OF RIGHT URETER WITH INTRALUMINAL DEVICE, VIA NATURAL OR ARTIFICIAL OPENING ENDOSCOPIC: ICD-10-PCS | Performed by: UROLOGY

## 2022-11-01 PROCEDURE — 80048 BASIC METABOLIC PNL TOTAL CA: CPT

## 2022-11-01 PROCEDURE — 74018 RADEX ABDOMEN 1 VIEW: CPT

## 2022-11-01 PROCEDURE — 88300 SURGICAL PATH GROSS: CPT

## 2022-11-01 PROCEDURE — C1769 GUIDE WIRE: HCPCS | Performed by: UROLOGY

## 2022-11-01 PROCEDURE — 3600000004 HC SURGERY LEVEL 4 BASE: Performed by: UROLOGY

## 2022-11-01 PROCEDURE — 36415 COLL VENOUS BLD VENIPUNCTURE: CPT

## 2022-11-01 DEVICE — URETERAL STENT
Type: IMPLANTABLE DEVICE | Site: URETER | Status: FUNCTIONAL
Brand: PERCUFLEX™ PLUS

## 2022-11-01 RX ORDER — SODIUM CHLORIDE 9 MG/ML
25 INJECTION, SOLUTION INTRAVENOUS PRN
Status: DISCONTINUED | OUTPATIENT
Start: 2022-11-01 | End: 2022-11-01 | Stop reason: HOSPADM

## 2022-11-01 RX ORDER — MIDAZOLAM HYDROCHLORIDE 1 MG/ML
INJECTION INTRAMUSCULAR; INTRAVENOUS PRN
Status: DISCONTINUED | OUTPATIENT
Start: 2022-11-01 | End: 2022-11-01 | Stop reason: SDUPTHER

## 2022-11-01 RX ORDER — MORPHINE SULFATE 2 MG/ML
2 INJECTION, SOLUTION INTRAMUSCULAR; INTRAVENOUS
Status: DISCONTINUED | OUTPATIENT
Start: 2022-11-01 | End: 2022-11-01 | Stop reason: HOSPADM

## 2022-11-01 RX ORDER — OXYCODONE HYDROCHLORIDE 5 MG/1
5 TABLET ORAL EVERY 4 HOURS PRN
Status: DISCONTINUED | OUTPATIENT
Start: 2022-11-01 | End: 2022-11-01 | Stop reason: HOSPADM

## 2022-11-01 RX ORDER — OXYCODONE HYDROCHLORIDE 5 MG/1
5 TABLET ORAL PRN
Status: DISCONTINUED | OUTPATIENT
Start: 2022-11-01 | End: 2022-11-01 | Stop reason: HOSPADM

## 2022-11-01 RX ORDER — OXYCODONE HYDROCHLORIDE 5 MG/1
10 TABLET ORAL PRN
Status: DISCONTINUED | OUTPATIENT
Start: 2022-11-01 | End: 2022-11-01 | Stop reason: HOSPADM

## 2022-11-01 RX ORDER — ONDANSETRON 2 MG/ML
4 INJECTION INTRAMUSCULAR; INTRAVENOUS EVERY 6 HOURS PRN
Status: DISCONTINUED | OUTPATIENT
Start: 2022-11-01 | End: 2022-11-01 | Stop reason: HOSPADM

## 2022-11-01 RX ORDER — LABETALOL HYDROCHLORIDE 5 MG/ML
10 INJECTION, SOLUTION INTRAVENOUS
Status: DISCONTINUED | OUTPATIENT
Start: 2022-11-01 | End: 2022-11-01 | Stop reason: HOSPADM

## 2022-11-01 RX ORDER — HYDROMORPHONE HCL 110MG/55ML
PATIENT CONTROLLED ANALGESIA SYRINGE INTRAVENOUS PRN
Status: DISCONTINUED | OUTPATIENT
Start: 2022-11-01 | End: 2022-11-01 | Stop reason: SDUPTHER

## 2022-11-01 RX ORDER — ROCURONIUM BROMIDE 10 MG/ML
INJECTION, SOLUTION INTRAVENOUS PRN
Status: DISCONTINUED | OUTPATIENT
Start: 2022-11-01 | End: 2022-11-01 | Stop reason: SDUPTHER

## 2022-11-01 RX ORDER — ENOXAPARIN SODIUM 100 MG/ML
40 INJECTION SUBCUTANEOUS DAILY
Status: DISCONTINUED | OUTPATIENT
Start: 2022-11-01 | End: 2022-11-01

## 2022-11-01 RX ORDER — MAGNESIUM HYDROXIDE 1200 MG/15ML
LIQUID ORAL PRN
Status: DISCONTINUED | OUTPATIENT
Start: 2022-11-01 | End: 2022-11-01 | Stop reason: HOSPADM

## 2022-11-01 RX ORDER — SODIUM CHLORIDE 0.9 % (FLUSH) 0.9 %
5-40 SYRINGE (ML) INJECTION EVERY 12 HOURS SCHEDULED
Status: DISCONTINUED | OUTPATIENT
Start: 2022-11-01 | End: 2022-11-01 | Stop reason: HOSPADM

## 2022-11-01 RX ORDER — ONDANSETRON 2 MG/ML
4 INJECTION INTRAMUSCULAR; INTRAVENOUS
Status: DISCONTINUED | OUTPATIENT
Start: 2022-11-01 | End: 2022-11-01 | Stop reason: HOSPADM

## 2022-11-01 RX ORDER — OXYCODONE HYDROCHLORIDE 5 MG/1
5 TABLET ORAL EVERY 6 HOURS PRN
Qty: 12 TABLET | Refills: 0 | Status: SHIPPED | OUTPATIENT
Start: 2022-11-01 | End: 2022-11-04

## 2022-11-01 RX ORDER — MORPHINE SULFATE 4 MG/ML
4 INJECTION, SOLUTION INTRAMUSCULAR; INTRAVENOUS
Status: DISCONTINUED | OUTPATIENT
Start: 2022-11-01 | End: 2022-11-01 | Stop reason: HOSPADM

## 2022-11-01 RX ORDER — DEXAMETHASONE SODIUM PHOSPHATE 10 MG/ML
INJECTION INTRAMUSCULAR; INTRAVENOUS PRN
Status: DISCONTINUED | OUTPATIENT
Start: 2022-11-01 | End: 2022-11-01 | Stop reason: SDUPTHER

## 2022-11-01 RX ORDER — SODIUM CHLORIDE, SODIUM LACTATE, POTASSIUM CHLORIDE, CALCIUM CHLORIDE 600; 310; 30; 20 MG/100ML; MG/100ML; MG/100ML; MG/100ML
INJECTION, SOLUTION INTRAVENOUS CONTINUOUS PRN
Status: DISCONTINUED | OUTPATIENT
Start: 2022-11-01 | End: 2022-11-01 | Stop reason: SDUPTHER

## 2022-11-01 RX ORDER — SODIUM CHLORIDE 0.9 % (FLUSH) 0.9 %
5-40 SYRINGE (ML) INJECTION PRN
Status: DISCONTINUED | OUTPATIENT
Start: 2022-11-01 | End: 2022-11-01 | Stop reason: HOSPADM

## 2022-11-01 RX ORDER — CEPHALEXIN 500 MG/1
500 CAPSULE ORAL 2 TIMES DAILY
Qty: 10 CAPSULE | Refills: 0 | Status: SHIPPED | OUTPATIENT
Start: 2022-11-01 | End: 2022-11-06

## 2022-11-01 RX ORDER — POLYETHYLENE GLYCOL 3350 17 G/17G
17 POWDER, FOR SOLUTION ORAL DAILY PRN
Status: DISCONTINUED | OUTPATIENT
Start: 2022-11-01 | End: 2022-11-01 | Stop reason: HOSPADM

## 2022-11-01 RX ORDER — ONDANSETRON 4 MG/1
4 TABLET, ORALLY DISINTEGRATING ORAL EVERY 8 HOURS PRN
Status: DISCONTINUED | OUTPATIENT
Start: 2022-11-01 | End: 2022-11-01 | Stop reason: HOSPADM

## 2022-11-01 RX ORDER — SODIUM CHLORIDE 9 MG/ML
INJECTION, SOLUTION INTRAVENOUS CONTINUOUS
Status: DISCONTINUED | OUTPATIENT
Start: 2022-11-01 | End: 2022-11-01 | Stop reason: HOSPADM

## 2022-11-01 RX ORDER — LIDOCAINE HYDROCHLORIDE 20 MG/ML
INJECTION, SOLUTION INFILTRATION; PERINEURAL PRN
Status: DISCONTINUED | OUTPATIENT
Start: 2022-11-01 | End: 2022-11-01 | Stop reason: SDUPTHER

## 2022-11-01 RX ORDER — ONDANSETRON 2 MG/ML
INJECTION INTRAMUSCULAR; INTRAVENOUS PRN
Status: DISCONTINUED | OUTPATIENT
Start: 2022-11-01 | End: 2022-11-01 | Stop reason: SDUPTHER

## 2022-11-01 RX ORDER — DIPHENHYDRAMINE HYDROCHLORIDE 50 MG/ML
12.5 INJECTION INTRAMUSCULAR; INTRAVENOUS
Status: DISCONTINUED | OUTPATIENT
Start: 2022-11-01 | End: 2022-11-01 | Stop reason: HOSPADM

## 2022-11-01 RX ORDER — PROPOFOL 10 MG/ML
INJECTION, EMULSION INTRAVENOUS PRN
Status: DISCONTINUED | OUTPATIENT
Start: 2022-11-01 | End: 2022-11-01 | Stop reason: SDUPTHER

## 2022-11-01 RX ORDER — SODIUM CHLORIDE 9 MG/ML
INJECTION, SOLUTION INTRAVENOUS PRN
Status: DISCONTINUED | OUTPATIENT
Start: 2022-11-01 | End: 2022-11-01 | Stop reason: HOSPADM

## 2022-11-01 RX ORDER — TAMSULOSIN HYDROCHLORIDE 0.4 MG/1
0.4 CAPSULE ORAL DAILY
Status: DISCONTINUED | OUTPATIENT
Start: 2022-11-01 | End: 2022-11-01 | Stop reason: HOSPADM

## 2022-11-01 RX ORDER — MEPERIDINE HYDROCHLORIDE 50 MG/ML
12.5 INJECTION INTRAMUSCULAR; INTRAVENOUS; SUBCUTANEOUS EVERY 5 MIN PRN
Status: DISCONTINUED | OUTPATIENT
Start: 2022-11-01 | End: 2022-11-01 | Stop reason: HOSPADM

## 2022-11-01 RX ADMIN — CEFAZOLIN 2 G: 10 INJECTION, POWDER, FOR SOLUTION INTRAVENOUS at 11:23

## 2022-11-01 RX ADMIN — SUGAMMADEX 200 MG: 100 INJECTION, SOLUTION INTRAVENOUS at 11:54

## 2022-11-01 RX ADMIN — ONDANSETRON 4 MG: 2 INJECTION INTRAMUSCULAR; INTRAVENOUS at 11:29

## 2022-11-01 RX ADMIN — PROPOFOL 200 MG: 10 INJECTION, EMULSION INTRAVENOUS at 11:29

## 2022-11-01 RX ADMIN — MORPHINE SULFATE 4 MG: 4 INJECTION, SOLUTION INTRAMUSCULAR; INTRAVENOUS at 10:00

## 2022-11-01 RX ADMIN — HYDROMORPHONE HYDROCHLORIDE 0.5 MG: 2 INJECTION INTRAMUSCULAR; INTRAVENOUS; SUBCUTANEOUS at 11:21

## 2022-11-01 RX ADMIN — SODIUM CHLORIDE, SODIUM LACTATE, POTASSIUM CHLORIDE, AND CALCIUM CHLORIDE: .6; .31; .03; .02 INJECTION, SOLUTION INTRAVENOUS at 11:23

## 2022-11-01 RX ADMIN — DEXAMETHASONE SODIUM PHOSPHATE 10 MG: 10 INJECTION INTRAMUSCULAR; INTRAVENOUS at 11:29

## 2022-11-01 RX ADMIN — SODIUM CHLORIDE: 9 INJECTION, SOLUTION INTRAVENOUS at 07:26

## 2022-11-01 RX ADMIN — MORPHINE SULFATE 4 MG: 4 INJECTION, SOLUTION INTRAMUSCULAR; INTRAVENOUS at 00:48

## 2022-11-01 RX ADMIN — LIDOCAINE HYDROCHLORIDE 60 MG: 20 INJECTION, SOLUTION INFILTRATION; PERINEURAL at 11:29

## 2022-11-01 RX ADMIN — ROCURONIUM BROMIDE 50 MG: 10 SOLUTION INTRAVENOUS at 11:29

## 2022-11-01 RX ADMIN — MIDAZOLAM HYDROCHLORIDE 2 MG: 2 INJECTION, SOLUTION INTRAMUSCULAR; INTRAVENOUS at 11:21

## 2022-11-01 RX ADMIN — MORPHINE SULFATE 2 MG: 2 INJECTION, SOLUTION INTRAMUSCULAR; INTRAVENOUS at 07:17

## 2022-11-01 ASSESSMENT — PAIN DESCRIPTION - ORIENTATION
ORIENTATION: RIGHT
ORIENTATION: RIGHT

## 2022-11-01 ASSESSMENT — PAIN DESCRIPTION - LOCATION
LOCATION: ABDOMEN;FLANK
LOCATION: FLANK

## 2022-11-01 ASSESSMENT — PAIN SCALES - GENERAL
PAINLEVEL_OUTOF10: 10
PAINLEVEL_OUTOF10: 0
PAINLEVEL_OUTOF10: 4
PAINLEVEL_OUTOF10: 0

## 2022-11-01 ASSESSMENT — PAIN DESCRIPTION - DESCRIPTORS
DESCRIPTORS: STABBING
DESCRIPTORS: STABBING

## 2022-11-01 NOTE — PROGRESS NOTES
Pt's IV line removed without complications. Discussed d/c instructions with patient, given opportunity to ask questions, and provided new medication education with side effects. Follow up appointment information included in d/c instructions. Pt verbalized understanding of d/c instructions. Patient was discharged to home with all belongings and taken outside via wheelchair.     Albret Puentes RN

## 2022-11-01 NOTE — PROGRESS NOTES
Patient arrived in PACU at this time and placed on monitor. Report received from [address] [place] [address] and [facility] CRNA. Will continue to monitor.    2 L/min O2 via nasal cannula.

## 2022-11-01 NOTE — PLAN OF CARE
Problem: Pain  Goal: Verbalizes/displays adequate comfort level or baseline comfort level  Outcome: Progressing       Pt scoring pain on 10-10 scale. Pain medications given per MAR. Pt instructed to call out when pain level increasing. Call light within reach. Nurse will continue to reassess and monitor.

## 2022-11-01 NOTE — CARE COORDINATION
CASE MANAGEMENT INITIAL ASSESSMENT      Reviewed chart and completed assessment with patient:bedside  Family present: none  Explained Case Management role/services. yes    Primary contact information:[facility] Decision Maker :   Primary Decision Maker: Susan Cuellar - Spouse - [phone]          Can this person be reached and be able to respond quickly, such as within a few minutes or hours? Yes      Admit date/status:10/31/22/ In  [facility]   Is this a Readmission?:  No      Insurance:BCBS   Precert required for SNF: Yes       3 night stay required: No    Living arrangements, Adls, care needs, prior to admission:Lives with spouse and kids. IPTA with all ADLS's. Durable Medical Equipment at home:  Walker__Cane__RTS__ BSC__Shower Chair__  02__ HHN__ CPAP__  BiPap__  Hospital Bed__ W/C___ Other_____    Services in the home and/or outpatient, prior to admission:none    Current PCP:Ty Nettles              Medications: Prescription coverage? Yes     Transportation needs: none         PT/OT recs:none    Hospital Exemption Notification (HEN):Needed for SNF    Barriers to discharge:none    Plan/comments:Patient plans to return home with spouse and children. Patient IPTA and is denying any needs on  DC. Will continue to follow.      Uche Martínez RN

## 2022-11-01 NOTE — DISCHARGE SUMMARY
Hospital Medicine Discharge Summary    Patient ID: Celeste Heaton      Patient's PCP: Andrew Cordero MD    Admit Date: 10/31/2022     Discharge Date: 11/1/2022      Admitting Provider: Martha Gómez MD     Discharge Provider: Brianna Ellis DO     Discharge Diagnoses: Active Hospital Problems    Diagnosis     Obstructive uropathy [N13.9]      Priority: Medium       The patient was seen and examined on day of discharge and this discharge summary is in conjunction with any daily progress note from day of discharge. Hospital Course:     43 y.o. male who presented to [facility] with right flank pain. He had a 3 mm mid right ureteral stone with right hydronephrosis. He had cystoscopy with right ureteroscopy. Stone was extracted and right ureteral stent inserted. He was okay for discharge and to remove stent in 4-5 days. He has some mild hematuria which is expected. Outpatient follow to be arranged by urology. Physical Exam Performed:     BP (!) 162/92   Pulse 63   Temp 98 °F (36.7 °C) (Oral)   Resp 16   Ht 6' (1.829 m)   Wt 206 lb 5.6 oz (93.6 kg)   SpO2 95%   BMI 27.99 kg/m²        Patient wished to be discharged in the evening after hours. Admitting physician with exam on H&P. Rounding physician after multiple attempts tried to see patient but was in procedure or not available. Labs:  For convenience and continuity at follow-up the following most recent labs are provided:      CBC:    Lab Results   Component Value Date/Time    WBC 10.6 10/31/2022 11:17 PM    HGB 12.9 10/31/2022 11:17 PM    HCT 37.7 10/31/2022 11:17 PM     10/31/2022 11:17 PM       Renal:    Lab Results   Component Value Date/Time     11/01/2022 03:22 PM    K 4.2 11/01/2022 03:22 PM    K 4.1 10/27/2022 01:55 AM     11/01/2022 03:22 PM    CO2 26 11/01/2022 03:22 PM    BUN 11 11/01/2022 03:22 PM    CREATININE 1.2 11/01/2022 03:22 PM    CALCIUM 9.1 11/01/2022 03:22 PM Significant Diagnostic Studies    Radiology:   XR ABDOMEN (KUB) (SINGLE AP VIEW)   Final Result   Proximal right urolithiasis, similar to 10/27/2022. Right ureteral stent is   been placed in the interval.  Please refer to procedure notes for additional   details. FLUORO FOR SURGICAL PROCEDURES   Final Result             Consults:     IP CONSULT TO HOSPITALIST  IP CONSULT TO UROLOGY    Disposition:  Home     Condition at Discharge: Stable    Discharge Instructions/Follow-up:  PCP in 1-2 week. Urology f/u     Code Status:  Full Code     Activity: activity as tolerated    Diet: regular diet      Discharge Medications:     Discharge Medication List as of 11/1/2022  5:23 PM             Details   cephALEXin (KEFLEX) 500 MG capsule Take 1 capsule by mouth 2 times daily for 5 days, Disp-10 capsule, R-0Normal      oxyCODONE (ROXICODONE) 5 MG immediate release tablet Take 1 tablet by mouth every 6 hours as needed for Pain for up to 3 days. Intended supply: 3 days. Take lowest dose possible to manage pain, Disp-12 tablet, R-0Normal                Details   !! ketorolac (TORADOL) 10 MG tablet Take 1 tablet by mouth every 6 hours as needed for Pain, Disp-10 tablet, R-0Normal      !! ketorolac (TORADOL) 10 MG tablet Take 1 tablet by mouth every 6 hours as needed for Pain, Disp-12 tablet, R-0Normal      ondansetron (ZOFRAN ODT) 4 MG disintegrating tablet Take 1 tablet by mouth every 8 hours as needed for Nausea, Disp-12 tablet, R-0Normal      tamsulosin (FLOMAX) 0.4 MG capsule Take 1 capsule by mouth daily for 5 doses, Disp-5 capsule, R-0Normal      atorvastatin (LIPITOR) 20 MG tablet Take 1 tablet by mouth daily, Disp-30 tablet, R-3Normal       !! - Potential duplicate medications found. Please discuss with provider. Time Spent on discharge is more than 15 minutes in the examination, evaluation, counseling and review of medications and discharge plan.       Signed:    Corina Maroin DO   11/1/2022      Thank you Laneta Gosselin, MD for the opportunity to be involved in this patient's care. If you have any questions or concerns, please feel free to contact me at [phone].

## 2022-11-01 NOTE — PROGRESS NOTES
Patient sent to [facility] in stable condition. VS WNL. Patient spouse on her way to the hospital, RN encouraged patient to notify her of OR time and to meet him down in [place].

## 2022-11-01 NOTE — ED PROVIDER NOTES
[place]  - MED SURG/[facility]THO      CHIEF COMPLAINT  Flank Pain (Pt here on Wednesday and D/c to F/u with urology for kidny stone. But states pain has not gotten any better and states has not passed the kidney stone. )       HISTORY OF PRESENT ILLNESS  Alexandr Quevedo is a 43 y.o. male who presents to the emergency department for evaluation of right-sided flank pain. Patient reports he was seen in the ER last Wednesday and diagnosed with a kidney stone. Says he was told that it is 3 mm and should pass on its own. He completed the Flomax that was given to him. Has been trying Toradol for pain with minimal to no relief. Asked to rate his pain from a scale 1-10, he rates his pain as a 22. Last Toradol was given at 6 PM.  Reports feeling nauseous today due to pain. Has not vomited. Took Zofran right before coming in and not helped with the nausea. No fevers or chills. Reports good urine output. Has noted some blood since yesterday. Reports he spoke with PCP, Dr. Felicitas Trujillo today who told him to come into the ER. No other complaints, modifying factors or associated symptoms. I have reviewed the following from the nursing documentation. No past medical history on file. No past surgical history on file.   Family History   Problem Relation Age of Onset    High Blood Pressure Father      Social History     Socioeconomic History    Marital status:      Spouse name: Not on file    Number of children: Not on file    Years of education: Not on file    Highest education level: Not on file   Occupational History    Not on file   Tobacco Use    Smoking status: Never    Smokeless tobacco: Never   Vaping Use    Vaping Use: Never used   Substance and Sexual Activity    Alcohol use: Yes     Comment: occas    Drug use: No    Sexual activity: Yes     Partners: Female   Other Topics Concern    Not on file   Social History Narrative    Not on file     Social Determinants of Health     Financial Resource Strain: Not on file   Food Insecurity: Not on file   Transportation Needs: Not on file   Physical Activity: Not on file   Stress: Not on file   Social Connections: Not on file   Intimate Partner Violence: Not on file   Housing Stability: Not on file     No current facility-administered medications for this encounter. Current Outpatient Medications   Medication Sig Dispense Refill    cephALEXin (KEFLEX) 500 MG capsule Take 1 capsule by mouth 2 times daily for 5 days 10 capsule 0    oxyCODONE (ROXICODONE) 5 MG immediate release tablet Take 1 tablet by mouth every 6 hours as needed for Pain for up to 3 days. Intended supply: 3 days. Take lowest dose possible to manage pain 12 tablet 0    ketorolac (TORADOL) 10 MG tablet Take 1 tablet by mouth every 6 hours as needed for Pain 10 tablet 0    ketorolac (TORADOL) 10 MG tablet Take 1 tablet by mouth every 6 hours as needed for Pain 12 tablet 0    ondansetron (ZOFRAN ODT) 4 MG disintegrating tablet Take 1 tablet by mouth every 8 hours as needed for Nausea 12 tablet 0    tamsulosin (FLOMAX) 0.4 MG capsule Take 1 capsule by mouth daily for 5 doses 5 capsule 0    atorvastatin (LIPITOR) 20 MG tablet Take 1 tablet by mouth daily (Patient not taking: No sig reported) 30 tablet 3     Allergies   Allergen Reactions    Codeine Rash and Hives       PMH, Surgical Hx, FH, Social Hx reviewed by myself. REVIEW OF SYSTEMS  10 systems reviewed, pertinent positives per HPI otherwise noted to be negative. PHYSICAL EXAM  BP (!) 162/92   Pulse 63   Temp 98 °F (36.7 °C) (Oral)   Resp 16   Ht 6' (1.829 m)   Wt 206 lb 5.6 oz (93.6 kg)   SpO2 95%   BMI 27.99 kg/m²    GENERAL APPEARANCE: Awake and alert. No acute distress. HENT: Normocephalic. Atraumatic. EOMI. No facial droop. HEART/CHEST: RRR. LUNGS: Respirations unlabored. Speaking comfortably in full sentences. ABDOMEN: Soft, non-distended abdomen. Right upper abdominal tenderness. Non tender to palpation. No guarding.  No rebound. EXTREMITIES: No gross deformities. Moving all extremities. SKIN: Warm and dry. No acute rashes. NEUROLOGICAL: Alert and oriented. No gross facial drooping. Answering questions appropriately. Moving all extremities. PSYCHIATRIC: Pleasant. Normal mood and affect.     LABS  Results for orders placed or performed during the hospital encounter of 10/31/22   Comprehensive Metabolic Panel   Result Value Ref Range    Sodium 136 136 - 145 mmol/L    Potassium 5.2 (H) 3.5 - 5.1 mmol/L    Chloride 100 99 - 110 mmol/L    CO2 25 21 - 32 mmol/L    Anion Gap 11 3 - 16    Glucose 106 (H) 70 - 99 mg/dL    BUN 11 7 - 20 mg/dL    Creatinine 1.1 0.9 - 1.3 mg/dL    Est, Glom Filt Rate >60 >60    Calcium 8.8 8.3 - 10.6 mg/dL    Total Protein 7.5 6.4 - 8.2 g/dL    Albumin 3.9 3.4 - 5.0 g/dL    Albumin/Globulin Ratio 1.1 1.1 - 2.2    Total Bilirubin 0.6 0.0 - 1.0 mg/dL    Alkaline Phosphatase 78 40 - 129 U/L    ALT 25 10 - 40 U/L    AST 41 (H) 15 - 37 U/L   CBC with Auto Differential   Result Value Ref Range    WBC 10.6 4.0 - 11.0 K/uL    RBC 4.12 (L) 4.20 - 5.90 M/uL    Hemoglobin 12.9 (L) 13.5 - 17.5 g/dL    Hematocrit 37.7 (L) 40.5 - 52.5 %    MCV 91.5 80.0 - 100.0 fL    MCH 31.4 26.0 - 34.0 pg    MCHC 34.3 31.0 - 36.0 g/dL    RDW 12.9 12.4 - 15.4 %    Platelets 088 837 - 652 K/uL    MPV 9.1 5.0 - 10.5 fL    Neutrophils % 74.3 %    Lymphocytes % 13.5 %    Monocytes % 9.6 %    Eosinophils % 2.2 %    Basophils % 0.4 %    Neutrophils Absolute 7.9 (H) 1.7 - 7.7 K/uL    Lymphocytes Absolute 1.4 1.0 - 5.1 K/uL    Monocytes Absolute 1.0 0.0 - 1.3 K/uL    Eosinophils Absolute 0.2 0.0 - 0.6 K/uL    Basophils Absolute 0.0 0.0 - 0.2 K/uL   Lactic Acid   Result Value Ref Range    Lactic Acid 0.9 0.4 - 2.0 mmol/L   Lipase   Result Value Ref Range    Lipase 30.0 13.0 - 60.0 U/L   Urinalysis with Reflex to Culture    Specimen: Urine, clean catch   Result Value Ref Range    Color, UA Yellow Straw/Yellow    Clarity, UA Clear Clear    Glucose, Ur Negative Negative mg/dL    Bilirubin Urine Negative Negative    Ketones, Urine Negative Negative mg/dL    Specific Gravity, UA 1.015 1.005 - 1.030    Blood, Urine LARGE (A) Negative    pH, UA 6.0 5.0 - 8.0    Protein, UA Negative Negative mg/dL    Urobilinogen, Urine 0.2 <2.0 E.U./dL    Nitrite, Urine Negative Negative    Leukocyte Esterase, Urine Negative Negative    Microscopic Examination YES     Urine Type NotGiven     Urine Reflex to Culture Not Indicated    Microscopic Urinalysis   Result Value Ref Range    WBC, UA 3-5 0 - 5 /HPF    RBC, UA 21-50 (A) 0 - 4 /HPF    Epithelial Cells, UA 0-1 0 - 5 /HPF   Basic metabolic panel   Result Value Ref Range    Sodium 139 136 - 145 mmol/L    Potassium 4.2 3.5 - 5.1 mmol/L    Chloride 100 99 - 110 mmol/L    CO2 26 21 - 32 mmol/L    Anion Gap 13 3 - 16    Glucose 111 (H) 70 - 99 mg/dL    BUN 11 7 - 20 mg/dL    Creatinine 1.2 0.9 - 1.3 mg/dL    Est, Glom Filt Rate >60 >60    Calcium 9.1 8.3 - 10.6 mg/dL       I have reviewed all labs for this visit. RADIOLOGY  CT ABDOMEN PELVIS W WO CONTRAST Additional Contrast? None    Result Date: 10/27/2022  EXAMINATION: CT OF THE ABDOMEN AND PELVIS WITH AND WITHOUT CONTRAST 10/27/2022 2:35 am TECHNIQUE: CT of the abdomen and pelvis was performed with and without the administration of intravenous contrast.  Multiplanar reformatted images are provided for review. Automated exposure control, iterative reconstruction, and/or weight based adjustment of the mA/kV was utilized to reduce the radiation dose to as low as reasonably achievable. COMPARISON: None.  HISTORY: ORDERING SYSTEM PROVIDED HISTORY: concern for appendicitis vs kidney stone TECHNOLOGIST PROVIDED HISTORY: Reason for exam:->concern for appendicitis vs kidney stone Additional Contrast?->None Decision Support Exception - unselect if not a suspected or confirmed emergency medical condition->Emergency Medical Condition (MA) Reason for Exam: right sided abdominal and flank pain FINDINGS: Lower Chest: Dependent and subsegmental atelectasis in the lower lungs. There is no pleural effusion. Organs: There is no hepatic mass, nodularity, or biliary dilatation. No mineralized stone at the gallbladder or surrounding fat stranding. The spleen is not enlarged. There is no pancreatic calcification, ductal dilatation, or surrounding fluid collection. Homogeneous enhancement of the pancreas. Adrenal glands are not enlarged. A less than 2 mm calculus in the lower pole of the left kidney. The left kidney enhancement is normal except for some scarring at the upper pole. There is minimal perinephric stranding around the left kidney. No left-sided hydronephrosis or hydroureter. Slightly delayed enhancement of the right kidney with minimally increased perinephric stranding. There is a mild right hydronephrosis and proximal hydroureter. A 3 mm calculus in the mid right ureter on series 2 image 100 and series 4, image 100. Distal right ureter is collapsed beyond the mid stone. GI/Bowel: Stomach, small bowel, and colon are not dilated. The appendix is well visualized and no appendicitis. There is no sign of colonic diverticulitis. There is no ascites or pneumoperitoneum. Pelvis: The urinary bladder wall is not significantly thickened. The prostate is not enlarged. No free fluid in the pelvis. Peritoneum/Retroperitoneum: No abdominal aortic aneurysm, retroperitoneal hematoma, or bulky adenopathy. Bones/Soft Tissues: Tiny fat containing umbilical hernia without complication. No acute fracture or destruction at the included bones. There is mild right hydronephrosis and proximal hydroureter due to a 3 mm calculus in the mid right ureter. Nonobstructing calculus in the lower pole left kidney without left-sided hydronephrosis or hydroureter. No appendicitis, bowel obstruction, or pneumoperitoneum.      XR ABDOMEN (KUB) (SINGLE AP VIEW)    Result Date: 11/1/2022  EXAMINATION: ONE SUPINE XRAY VIEW(S) OF THE ABDOMEN 11/1/2022 12:12 pm COMPARISON: CT abdomen and pelvis 10/27/2022 HISTORY: ORDERING SYSTEM PROVIDED HISTORY: cysto TECHNOLOGIST PROVIDED HISTORY: Reason for exam:->cysto Reason for Exam: cysto FLUOROSCOPY TIME: 0.6 minute Cumulative air Kerma 3 mGy 1 image FINDINGS: A right ureteral stent projects in normal position. A 3 mm calculus is faintly visualized in the proximal right ureter, alongside the ureteral stent. Proximal right urolithiasis, similar to 10/27/2022. Right ureteral stent is been placed in the interval.  Please refer to procedure notes for additional details. FLUORO FOR SURGICAL PROCEDURES    Result Date: 11/1/2022  Fluoro used for positioning purposes only. No radiologists report. ED COURSE/MDM  Patient seen and evaluated. At presentation, patient was awake, alert, afebrile, hypertensive to 145/89, and satting 98% on room air. Abdomen soft, nondistended, and mildly tender to palpation over the right side of the abdomen only. No guarding or rebound present. Per chart review, he had a CT abdomen pelvis with and without contrast 4 days ago that showed a 3 mm calculus in the mid right ureter with mild right hydronephrosis. He was given morphine as needed for pain. He was given 1 L IV fluid bolus. Repeat labs and urine obtained. Creatinine is normal. After morphine, pain improved to 4 out of 10. Patient feels like his pain will not be controlled as outpatient and wishes to be admitted. Hospitalist consulted for admission. Admit. Is this patient to be included in the SEP-1 Core Measure due to severe sepsis or septic shock? No   Exclusion criteria - the patient is NOT to be included for SEP-1 Core Measure due to:  2+ SIRS criteria are not met   Pt was seen during the COVID 19 pandemic. Appropriate PPE worn by ME during patient encounters.  Patient was cared for during a time with constrained hospital bed capacity with nationwide stress on resources and staffing. During the patient's ED course, the patient was given:  Medications   tamsulosin (FLOMAX) capsule 0.4 mg (0.4 mg Oral Given 10/31/22 5842)   0.9 % sodium chloride bolus (0 mLs IntraVENous Stopped 11/1/22 1665)        CLINICAL IMPRESSION  1. Ureteric stone    2. Right ureteral stone        Blood pressure (!) 162/92, pulse 63, temperature 98 °F (36.7 °C), temperature source Oral, resp. rate 16, height 6' (1.829 m), weight 206 lb 5.6 oz (93.6 kg), SpO2 95 %. DISPOSITION  Filipe Lamar was admitted to the hospital.    Patient was given scripts for the following medications. I counseled patient how to take these medications. Discharge Medication List as of 11/1/2022  5:23 PM        START taking these medications    Details   cephALEXin (KEFLEX) 500 MG capsule Take 1 capsule by mouth 2 times daily for 5 days, Disp-10 capsule, R-0Normal      oxyCODONE (ROXICODONE) 5 MG immediate release tablet Take 1 tablet by mouth every 6 hours as needed for Pain for up to 3 days. Intended supply: 3 days. Take lowest dose possible to manage pain, Disp-12 tablet, R-0Normal             Follow-up with:  Amari Kenny MD  [address] [facility] [place] [address] [phone]    Follow up in 1 month(s)      DISCLAIMER: This chart was created using Dragon dictation software. Efforts were made by me to ensure accuracy, however some errors may be present due to limitations of this technology and occasionally words are not transcribed correctly.        Varsha Carreno MD  11/03/22 2760

## 2022-11-01 NOTE — ANESTHESIA POSTPROCEDURE EVALUATION
Department of Anesthesiology  Postprocedure Note    Patient: Fercho Painting  MRN: [id number]  YOB: 1980  Date of evaluation: 11/1/2022      Procedure Summary     Date: 11/01/22 Room / Location: [facility] OR [address] [place] [address]    Anesthesia Start: 7117 Anesthesia Stop: 8172    Procedure: CYSTOSCOPY, RIGHT URETEROSCOPY, STONE MANIPULATION AND EXTRACTION,  RIGHT  URETERAL STENT INSERTION (Right: Bladder) Diagnosis:       Right ureteral stone      (RIGHT URETERAL STONE)    Surgeons: Ai Rain MD Responsible Provider: Marsha Huff MD    Anesthesia Type: general ASA Status: 2          Anesthesia Type: No value filed.     Antionette Phase I: Antionette Score: 8    Antionette Phase II:        Anesthesia Post Evaluation    Comments: Postoperative Anesthesia Note    Name:    Fercho Painting  MRN:      [id number]    Patient Vitals in the past 12 hrs:  11/01/22 1530, BP:(!) 162/92, Temp:98 °F (36.7 °C), Temp src:Oral, Pulse:63, Resp:16, SpO2:95 %  11/01/22 1240, Pulse:56, Resp:12, SpO2:94 %  11/01/22 1235, BP:124/74, Pulse:58, Resp:(!) 6, SpO2:98 %  11/01/22 1230, BP:121/79, Pulse:56, Resp:(!) 0, SpO2:94 %  11/01/22 1225, BP:123/77, Pulse:58, Resp:(!) 0, SpO2:94 %  11/01/22 1220, BP:118/69, Pulse:54, Resp:(!) 6, SpO2:96 %  11/01/22 1215, Pulse:55, Resp:(!) 0, SpO2:95 %  11/01/22 1211, Temp:(!) 96.6 °F (35.9 °C), Temp src:Temporal  11/01/22 1210, BP:118/71, Pulse:54, Resp:(!) 0, SpO2:92 %  11/01/22 0945, BP:(!) 145/90, Temp:98 °F (36.7 °C), Temp src:Oral, Pulse:54, Resp:16, SpO2:99 %  11/01/22 0747, Resp:16  11/01/22 0626, BP:138/89, Temp:98.1 °F (36.7 °C), Temp src:Oral, Pulse:(!) 49, Resp:16, SpO2:98 %, Height:6' (1.829 m), Weight:206 lb 5.6 oz (93.6 kg)     LABS:    CBC  Lab Results       Component                Value               Date/Time                  WBC                      10.6                10/31/2022 11:17 PM        HGB                      12.9 (L)            10/31/2022 11:17 PM HCT                      37.7 (L)            10/31/2022 11:17 PM        PLT                      218                 10/31/2022 11:17 PM   RENAL  Lab Results       Component                Value               Date/Time                  NA                       139                 11/01/2022 03:22 PM        K                        4.2                 11/01/2022 03:22 PM        K                        4.1                 10/27/2022 01:55 AM        CL                       100                 11/01/2022 03:22 PM        CO2                      26                  11/01/2022 03:22 PM        BUN                      11                  11/01/2022 03:22 PM        CREATININE               1.2                 11/01/2022 03:22 PM        GLUCOSE                  111 (H)             11/01/2022 03:22 PM   COAGS  No results found for: PROTIME, INR, APTT    Intake & Output:  In: 200 (I.V.:200)  Out: -     Nausea & Vomiting:  No    Level of Consciousness:  Awake    Pain Assessment:  Adequate analgesia    Anesthesia Complications:  No apparent anesthetic complications    SUMMARY      Vital signs stable  OK to discharge from Stage I post anesthesia care.   Care transferred from Anesthesiology department on discharge from perioperative area

## 2022-11-01 NOTE — DISCHARGE INSTRUCTIONS
[facility]      Cystoscopy Discharge Instructions    Pull string to remove stent in 4-5 days. You may experience : Burning sensation when you void     A feeling of a need to go to the bathroom frequently     Your urine may be blood tinged    These symptoms should be relieved within a few hours as you increase the amounts of fluids you drink and the number of times that you empty your bladder. We recommended that you drink plenty of fluid a few days after surgery. No strenuous activities until you talk to your doctor. You may feel light headed up to 24 hours after anesthesia. You should not do the following for the next 24 hours. Drive a car, operate machinery or power tools     Drink any alcoholic drinks (not even beer or wine)     Make any important decisions, i.e., signing important papers. It is recommended that you begin with clear liquids and/or light foods. If you  Are not nauseated, progress to your normal diet. I you are unable to urinate you should call your surgeon.     Dr. Otto Jin

## 2022-11-01 NOTE — TELEPHONE ENCOUNTER
----- Message from Juanita Euceda MD sent at 11/1/2022  1:09 PM EDT -----  Contact: Esaenid Adjutant   [phone]  [facility]  Dr. Boss How    ----- Message -----  From: Rhonda Garcia  Sent: 10/31/2022   4:23 PM EDT  To: Juanita Euceda MD      ----- Message -----  From: Norma Console  Sent: 10/31/2022   4:20 PM EDT  To: Rhonda Garcia    Patient wants to be referred to a [facility] [id number] - [address] [address] [phone] - F [phone] (Ph: [phone])

## 2022-11-01 NOTE — ANESTHESIA PRE PROCEDURE
Department of Anesthesiology  Preprocedure Note       Name:  Cliff Bowen   Age:  43 y.o.  :  1980                                          MRN:  [id number]         Date:  2022      Surgeon: Pau Lopez):  Tommie Trejo MD    Procedure: Procedure(s):  CYSTOSCOPY, RIGHT URETEROSCOPY,POSSIBLE LASER LITHOTRIPSY,STONE EXTRACTION, RIGHT  URETERAL STENT INSERTION    Medications prior to admission:   Prior to Admission medications    Medication Sig Start Date End Date Taking?  Authorizing Provider   ketorolac (TORADOL) 10 MG tablet Take 1 tablet by mouth every 6 hours as needed for Pain 10/30/22 10/30/23  Montserrat Cervantes MD   ketorolac (TORADOL) 10 MG tablet Take 1 tablet by mouth every 6 hours as needed for Pain 10/27/22 10/27/23  Beatriz Heath MD   ondansetron (ZOFRAN ODT) 4 MG disintegrating tablet Take 1 tablet by mouth every 8 hours as needed for Nausea 10/27/22   Beatriz Heath MD   tamsulosin [facility]) 0.4 MG capsule Take 1 capsule by mouth daily for 5 doses 10/27/22 11/1/22  Beatriz Heath MD   atorvastatin (LIPITOR) 20 MG tablet Take 1 tablet by mouth daily  Patient not taking: No sig reported 22   Laneta Gosselin, MD       Current medications:    Current Facility-Administered Medications   Medication Dose Route Frequency Provider Last Rate Last Admin    0.9 % sodium chloride infusion   IntraVENous Continuous Evelyn Alford MD 75 mL/hr at 22 0726 New Bag at 22 0726    sodium chloride flush 0.9 % injection 5-40 mL  5-40 mL IntraVENous 2 times per day Evelyn Alford MD        sodium chloride flush 0.9 % injection 5-40 mL  5-40 mL IntraVENous PRN Evelyn Alford MD        0.9 % sodium chloride infusion   IntraVENous PRN Evelyn Alford MD        ondansetron (ZOFRAN-ODT) disintegrating tablet 4 mg  4 mg Oral Q8H PRN Evelyn Alford MD        Or    ondansetron (ZOFRAN) injection 4 mg  4 mg IntraVENous Q6H PRN MD Tavares Guerrier polyethylene glycol (GLYCOLAX) packet 17 g  17 g Oral Daily PRN Merry Ramos MD        morphine (PF) injection 2 mg  2 mg IntraVENous Q2H PRN Merry Ramos MD   2 mg at 11/01/22 8610    Or    morphine sulfate (PF) injection 4 mg  4 mg IntraVENous Q2H PRN Merry Ramos MD   4 mg at 11/01/22 1000    tamsulosin (FLOMAX) capsule 0.4 mg  0.4 mg Oral Daily Merry Ramos MD           Allergies: Allergies   Allergen Reactions    Codeine Rash and Hives       Problem List:    Patient Active Problem List   Diagnosis Code    Hypercholesterolemia E78.00    History of vasectomy Z98.52    Obstructive uropathy N13.9       Past Medical History:  No past medical history on file. Past Surgical History:  No past surgical history on file. Social History:    Social History     Tobacco Use    Smoking status: Never    Smokeless tobacco: Never   Substance Use Topics    Alcohol use: Yes     Comment: occas                                Counseling given: Not Answered      Vital Signs (Current):   Vitals:    11/01/22 0529 11/01/22 0626 11/01/22 0747 11/01/22 0945   BP: 115/76 138/89  (!) 145/90   Pulse: (!) 49 (!) 49  54   Resp: 11 16 16 16   Temp:  98.1 °F (36.7 °C)  98 °F (36.7 °C)   TempSrc:  Oral  Oral   SpO2: 96% 98%  99%   Weight:  206 lb 5.6 oz (93.6 kg)     Height:  6' (1.829 m)                                                BP Readings from Last 3 Encounters:   11/01/22 (!) 145/90   10/27/22 123/75   06/08/22 120/78       NPO Status: Time of last liquid consumption: 1200                        Time of last solid consumption: 1200                        Date of last liquid consumption: 10/31/22                        Date of last solid food consumption: 10/31/22    BMI:   Wt Readings from Last 3 Encounters:   11/01/22 206 lb 5.6 oz (93.6 kg)   10/27/22 204 lb (92.5 kg)   06/08/22 206 lb (93.4 kg)     Body mass index is 27.99 kg/m².     CBC:   Lab Results   Component Value Date/Time    WBC 10.6 10/31/2022 11:17 PM    RBC 4.12 10/31/2022 11:17 PM    HGB 12.9 10/31/2022 11:17 PM    HCT 37.7 10/31/2022 11:17 PM    MCV 91.5 10/31/2022 11:17 PM    RDW 12.9 10/31/2022 11:17 PM     10/31/2022 11:17 PM       CMP:   Lab Results   Component Value Date/Time     10/31/2022 11:17 PM    K 5.2 10/31/2022 11:17 PM    K 4.1 10/27/2022 01:55 AM     10/31/2022 11:17 PM    CO2 25 10/31/2022 11:17 PM    BUN 11 10/31/2022 11:17 PM    CREATININE 1.1 10/31/2022 11:17 PM    GFRAA >60 06/07/2022 09:06 AM    AGRATIO 1.1 10/31/2022 11:17 PM    LABGLOM >60 10/31/2022 11:17 PM    GLUCOSE 106 10/31/2022 11:17 PM    PROT 7.5 10/31/2022 11:17 PM    CALCIUM 8.8 10/31/2022 11:17 PM    BILITOT 0.6 10/31/2022 11:17 PM    ALKPHOS 78 10/31/2022 11:17 PM    AST 41 10/31/2022 11:17 PM    ALT 25 10/31/2022 11:17 PM       POC Tests: No results for input(s): POCGLU, POCNA, POCK, POCCL, POCBUN, POCHEMO, POCHCT in the last 72 hours. Coags: No results found for: PROTIME, INR, APTT    HCG (If Applicable): No results found for: PREGTESTUR, PREGSERUM, HCG, HCGQUANT     ABGs: No results found for: PHART, PO2ART, SOM9PYX, GQP1JXX, BEART, D4NTRYKH     Type & Screen (If Applicable):  No results found for: LABABO, LABRH    Drug/Infectious Status (If Applicable):  No results found for: HIV, HEPCAB    COVID-19 Screening (If Applicable): No results found for: COVID19        Anesthesia Evaluation   no history of anesthetic complications:   Airway: Mallampati: III  TM distance: >3 FB   Neck ROM: full  Mouth opening: > = 3 FB   Dental: normal exam         Pulmonary:Negative Pulmonary ROS                              Cardiovascular:    (+) hyperlipidemia                  Neuro/Psych:   Negative Neuro/Psych ROS              GI/Hepatic/Renal:   (+) renal disease: kidney stones,           Endo/Other: Negative Endo/Other ROS                    Abdominal:             Vascular: negative vascular ROS.          Other Findings:           Anesthesia Plan      general     ASA 2     (Pt agrees to risks, benefits and alternatives of GETA. Questions answered. Willing to proceed with plan.)  Induction: intravenous. Anesthetic plan and risks discussed with patient.                         Karen Tierney MD   11/1/2022

## 2022-11-01 NOTE — H&P
Hospital Medicine History & Physical      PCP: Laneta Gosselin, MD    Date of Admission: 10/31/2022    Date of Service: Pt seen/examined on 11/1/2022 and Admitted to Inpatient with expected LOS greater than two midnights due to medical therapy. Chief Complaint: Right flank pain      History Of Present Illness:    43 y.o. male who presented to [facility] with right flank pain. Patient reports he has been having intermittent right flank pain radiating into his right groin that started about a week ago. He presented to the ER on 10/27 with hematuria and right flank pain, nausea and vomiting. At that time CT abdomen pelvis done showed mild right-sided hydronephrosis with a proximal hydroureter and 3 mm stone in the right mid ureter. Patient was given pain medications, Flomax and discharged home. He was told the stone will likely pass. However patient has had intermittent episodes of right flank pain associated with nausea, the pain worsened earlier today, took Toradol and Zofran at home without much relief. Patient reports pain currently 22/10, continuous, right flank, nonradiating associated with nausea but no vomiting, no fevers or chills reported. Has observed intermittent hematuria. Denies history of stones in the past.  Patient reports he has been taking his Flomax. Past Medical History:      Hyperlipidemia    Past Surgical History:      No past surgical history on file. Medications Prior to Admission:      Prior to Admission medications    Medication Sig Start Date End Date Taking?  Authorizing Provider   ketorolac (TORADOL) 10 MG tablet Take 1 tablet by mouth every 6 hours as needed for Pain 10/30/22 10/30/23  Montserrat Cervantes MD   ketorolac (TORADOL) 10 MG tablet Take 1 tablet by mouth every 6 hours as needed for Pain 10/27/22 10/27/23  Beatriz Heath MD   ondansetron (ZOFRAN ODT) 4 MG disintegrating tablet Take 1 tablet by mouth every 8 hours as needed for Nausea 10/27/22 Esa Ortega MD   tamsulosin [facility]) 0.4 MG capsule Take 1 capsule by mouth daily for 5 doses 10/27/22 11/1/22  Esa Ortega MD   atorvastatin (LIPITOR) 20 MG tablet Take 1 tablet by mouth daily  Patient not taking: Reported on 10/27/2022 6/8/22   Daisy Scott MD       Allergies:  Codeine    Social History:      The patient currently lives at home    TOBACCO:   reports that he has never smoked. He has never used smokeless tobacco.  ETOH:   reports current alcohol use. E-cigarette/Vaping       Questions Responses    E-cigarette/Vaping Use Never User    Start Date     Passive Exposure     Quit Date     Counseling Given     Comments               Family History:    Reviewed and negative in regards to presenting illness/complaint. Problem Relation Age of Onset    High Blood Pressure Father        REVIEW OF SYSTEMS COMPLETED:   Pertinent positives as noted in the HPI. All other systems reviewed and negative. PHYSICAL EXAM PERFORMED:    /71   Pulse 55   Temp 98.3 °F (36.8 °C) (Oral)   Resp 10   Ht 6' (1.829 m)   Wt 204 lb (92.5 kg)   SpO2 92%   BMI 27.67 kg/m²     General appearance:  No apparent distress, appears stated age and cooperative. HEENT:  Normal cephalic, atraumatic without obvious deformity. Pupils equal, round, and reactive to light. Extra ocular muscles intact. Conjunctivae/corneas clear. Neck: Supple, with full range of motion. No jugular venous distention. Trachea midline. Respiratory:  Normal respiratory effort. Clear to auscultation, bilaterally without Rales/Wheezes/Rhonchi. Cardiovascular:  Regular rate and rhythm with normal S1/S2 without murmurs, rubs or gallops. Abdomen: Soft, non-tender, non-distended with normal bowel sounds. Musculoskeletal:  No clubbing, cyanosis or edema bilaterally. Full range of motion without deformity. Skin: Skin color, texture, turgor normal.  No rashes or lesions.   Neurologic:  Neurovascularly intact without any focal sensory/motor deficits. Cranial nerves: II-XII intact, grossly non-focal.  Psychiatric:  Alert and oriented, thought content appropriate, normal insight  Capillary Refill: Brisk,3 seconds, normal  Peripheral Pulses: +2 palpable, equal bilaterally       Labs:     Recent Labs     10/31/22  2317   WBC 10.6   HGB 12.9*   HCT 37.7*        Recent Labs     10/31/22  2317      K 5.2*      CO2 25   BUN 11   CREATININE 1.1   CALCIUM 8.8     Recent Labs     10/31/22  2317   AST 41*   ALT 25   BILITOT 0.6   ALKPHOS 78     No results for input(s): INR in the last 72 hours. No results for input(s): Lattie Hanny in the last 72 hours. Urinalysis:      Lab Results   Component Value Date/Time    NITRU Negative 10/31/2022 11:34 PM    WBCUA 3-5 10/31/2022 11:34 PM    BACTERIA 1+ 04/12/2018 08:11 PM    RBCUA 21-50 10/31/2022 11:34 PM    BLOODU LARGE 10/31/2022 11:34 PM    SPECGRAV 1.015 10/31/2022 11:34 PM    GLUCOSEU Negative 10/31/2022 11:34 PM       Radiology:     CT abdomen pelvis from 10/27  RADIOLOGY:  CT ABDOMEN PELVIS W WO CONTRAST Additional Contrast? None   Final Result   There is mild right hydronephrosis and proximal hydroureter due to a 3 mm   calculus in the mid right ureter. Nonobstructing calculus in the lower pole left kidney without left-sided   hydronephrosis or hydroureter. No appendicitis, bowel obstruction, or pneumoperitoneum.          Consults:    IP CONSULT TO HOSPITALIST  IP CONSULT TO UROLOGY    ASSESSMENT:PLAN:    Active Hospital Problems    Diagnosis Date Noted    Obstructive uropathy [N13.9] 11/01/2022     Priority: Medium     Obstructive uropathy  -3 mm right mid ureteral stone with the right hydronephrosis, mild on CT scan on 10/27 with persistent symptoms of ureteral colic, nausea and hematuria  -Admit to inpatient  -We will consult urology  -Keep him n.p.o. in case he needs cystourethroscopy/stent placement  -Continue Flomax and pain control    Mild hyperkalemia-should improve with IV fluids, recheck BMP in a.m. HLD-patient not taking statin anymore, managed by PCP    DVT Prophylaxis: scd  Diet: No diet orders on file  Code Status: No Order    PT/OT Eval Status: Ambulatory    Dispo -IP stay       Petty Almeida MD    Thank you Neisha Willoughby MD for the opportunity to be involved in this patient's care. If you have any questions or concerns please feel free to contact me at [phone].

## 2022-11-01 NOTE — CONSULTS
Urology Consult Note      Reason for Consultation: ureteral colic with stone     Chief Complaint: \"right flank and abdominal pain\"  HPI:  Kory Pack is a 43 y.o. male who presented with 1 wk history of right flank and abdominal pain. It is severe in nature and associated with nausea and vomiting. Went to ER for eval on 10/27 where CT revealed a 3 mm mid right ureteral stone with right hydronephrosis. He was discharged with oral pain medication, nausea medication, and flomax. Over the past week his pain has progressively worsened to the point that oral pain medication was no longer relieving his symptoms, so he returned to ER on 11/1 and was admitted for further management. Current location of pain is unchanged from onset. No past medical history on file. No past surgical history on file.     Medication List reviewed:      Current Facility-Administered Medications   Medication Dose Route Frequency Provider Last Rate Last Admin    0.9 % sodium chloride infusion   IntraVENous Continuous Yogesh Jesus MD 75 mL/hr at 11/01/22 0726 New Bag at 11/01/22 0726    sodium chloride flush 0.9 % injection 5-40 mL  5-40 mL IntraVENous 2 times per day Yogesh Jesus MD        sodium chloride flush 0.9 % injection 5-40 mL  5-40 mL IntraVENous PRN Yogesh Jesus MD        0.9 % sodium chloride infusion   IntraVENous PRN Yogesh Jesus MD        ondansetron (ZOFRAN-ODT) disintegrating tablet 4 mg  4 mg Oral Q8H PRN Yogesh Jesus MD        Or    ondansetron (ZOFRAN) injection 4 mg  4 mg IntraVENous Q6H PRN Yogesh Jesus MD        polyethylene glycol (GLYCOLAX) packet 17 g  17 g Oral Daily PRN Yogesh Jesus MD        morphine (PF) injection 2 mg  2 mg IntraVENous Q2H PRN Yogesh Jesus MD   2 mg at 11/01/22 5564    Or    morphine sulfate (PF) injection 4 mg  4 mg IntraVENous Q2H PRN Yogesh Jesus MD        tamsulosin (FLOMAX) capsule 0.4 mg  0.4 mg Oral Daily Deepika Hays MD           Allergies   Allergen Reactions    Codeine Rash and Hives       Family History   Problem Relation Age of Onset    High Blood Pressure Father        Social History     Tobacco Use    Smoking status: Never    Smokeless tobacco: Never   Vaping Use    Vaping Use: Never used   Substance Use Topics    Alcohol use: Yes     Comment: occas    Drug use: No         Review of Systems: A 12 point ROS was performed and was unremarkable unless listed in the history of present illness. No intake/output data recorded. Vitals:  /89   Pulse (!) 49   Temp 98.1 °F (36.7 °C) (Oral)   Resp 16   Ht 6' (1.829 m)   Wt 206 lb 5.6 oz (93.6 kg)   SpO2 98%   BMI 27.99 kg/m²   Temp  Av.2 °F (36.8 °C)  Min: 98.1 °F (36.7 °C)  Max: 98.3 °F (36.8 °C)  No intake or output data in the 24 hours ending 22 0914      Physical:  Well developed, well nourished in no acute distress  Mood indicates no abnormalities. Pt doesnt appear depressed  Orientated to time and place  Neck is supple, trachea is midline  Respiratory effort is normal  Cardiovascular show no extremity swelling  Abdomen soft, not distended.   Mildly TTP in the RLQ      Labs:  WBC:    Lab Results   Component Value Date/Time    WBC 10.6 10/31/2022 11:17 PM     Hemoglobin/Hematocrit:    Lab Results   Component Value Date/Time    HGB 12.9 10/31/2022 11:17 PM    HCT 37.7 10/31/2022 11:17 PM     BMP:    Lab Results   Component Value Date/Time     10/31/2022 11:17 PM    K 5.2 10/31/2022 11:17 PM    K 4.1 10/27/2022 01:55 AM     10/31/2022 11:17 PM    CO2 25 10/31/2022 11:17 PM    BUN 11 10/31/2022 11:17 PM    LABALBU 3.9 10/31/2022 11:17 PM    CREATININE 1.1 10/31/2022 11:17 PM    CALCIUM 8.8 10/31/2022 11:17 PM    GFRAA >60 2022 09:06 AM    LABGLOM >60 10/31/2022 11:17 PM     PT/INR:  No results found for: PROTIME, INR  PTT:  No results found for: APTT[APTT    Urinalysis:    Latest Reference Range & Units 10/31/22 23:34   Color, UA Straw/Yellow  Yellow   Clarity, UA Clear  Clear   Glucose, UA Negative mg/dL Negative   Bilirubin, Urine Negative  Negative   Ketones, Urine Negative mg/dL Negative   Specific Gravity, UA 1.005 - 1.030  1.015   Blood, Urine Negative  LARGE !   pH, UA 5.0 - 8.0  6.0   Protein, UA Negative mg/dL Negative   Urobilinogen, Urine <2.0 E.U./dL 0.2   Nitrite, Urine Negative  Negative   Leukocyte Esterase, Urine Negative  Negative   Urine Type  NotGiven   Urine Reflex to Culture  Not Indicated   WBC, UA 0 - 5 /HPF 3-5   RBC, UA 0 - 4 /HPF 21-50 ! Epithelial Cells, UA 0 - 5 /HPF 0-1   Microscopic Examination  YES   !: Data is abnormal    Urine Culture: None       Antibiotic Therapy: None     Imaging:     CT ap w wo contrast 10/27/22  There is mild right hydronephrosis and proximal hydroureter due to a 3 mm   calculus in the mid right ureter. Nonobstructing calculus in the lower pole left kidney without left-sided   hydronephrosis or hydroureter. No appendicitis, bowel obstruction, or pneumoperitoneum. Impression/Plan:     3 mm mid right ureteral stone   Right hydronephrosis  Intractable right flank pain   - discussed stone intervention with patient, he is miserable and wishes to proceed with surgery. Plan for cystoscopy with right ureteroscopy today with Dr. Lianne Chahal.   The risks and benefits of surgery as well as possible need for ureteral stent and follow up procedures were discussed in detail with patient, he agrees to proceed     Louis Soto PA-C  11/1/2022

## 2022-11-01 NOTE — BRIEF OP NOTE
Brief Postoperative Note      Patient: Kraig Castillo  YOB: 1980  MRN: [id number]    Date of Procedure: 11/1/2022    Pre-Op Diagnosis: RIGHT URETERAL STONE    Post-Op Diagnosis: Same       Procedure(s):  CYSTOSCOPY, RIGHT URETEROSCOPY, STONE MANIPULATION AND EXTRACTION,  RIGHT  URETERAL STENT INSERTION    Surgeon(s):  Tiago Mckenzie MD    Assistant:  Surgical Assistant: Mary Garcia    Anesthesia: General    Estimated Blood Loss (mL): Minimal    Complications: None    Specimens:   * No specimens in log *    Implants:  Implant Name Type Inv. Item Serial No.  Lot No. LRB No. Used Action   STENT URET L26CM OD4. 8FR HYDR+ DBL PGTL FLX TAPR TIP THRD - [id number]  STENT URET L26CM OD4. 8FR HYDR+ DBL PGTL FLX TAPR TIP THRD  [employer] UROLOG[employer]- [id number] Right 1 Implanted         Drains: * No LDAs found *    Findings: right mid ureteral stone    Plan- ok for discharge later today. Pt knows to remove stent in 4-5 days. Will arrange outpatient f/u.     Electronically signed by Tiago Mckenzie MD on 11/1/2022 at 12:08 PM

## 2022-11-02 ENCOUNTER — TELEPHONE (OUTPATIENT)
Dept: INTERNAL MEDICINE CLINIC | Age: 42
End: 2022-11-02

## 2022-11-02 NOTE — TELEPHONE ENCOUNTER
[facility] Initial Follow Up Call    Outreach made within 2 business days of discharge: Yes    Patient: Ayse Robbins Patient : 1980   MRN: <[id number]>  Reason for Admission: There are no discharge diagnoses documented for the most recent discharge. Discharge Date: 22       Spoke with: Patient     Discharge department/facility: [facility] Interactive Patient Contact:  Was patient able to fill all prescriptions: Yes  Was patient instructed to bring all medications to the follow-up visit: Yes  Is patient taking all medications as directed in the discharge summary?  Yes  Does patient understand their discharge instructions: Yes  Does patient have questions or concerns that need addressed prior to 7-14 day follow up office visit: no    Scheduled appointment with PCP within 7-14 days    Follow Up  Future Appointments   Date Time Provider Beau French   2022  8:30 AM MD Saeid Aragon Int None       Kourtney Hill

## 2022-11-03 NOTE — ANESTHESIA POSTPROCEDURE EVALUATION
Department of Anesthesiology  Postprocedure Note    Patient: Brenton Gomez  MRN: [id number]  YOB: 1980  Date of evaluation: 11/3/2022      Procedure Summary     Date: 11/01/22 Room / Location: [facility] OR [address] [place] [address]    Anesthesia Start: 0823 Anesthesia Stop: 1995    Procedure: CYSTOSCOPY, RIGHT URETEROSCOPY, STONE MANIPULATION AND EXTRACTION,  RIGHT  URETERAL STENT INSERTION (Right: Bladder) Diagnosis:       Right ureteral stone      (RIGHT URETERAL STONE)    Surgeons: Niall Argueta MD Responsible Provider: Araceli Henson MD    Anesthesia Type: general ASA Status: 2          Anesthesia Type: No value filed. Antionette Phase I: Antionette Score: 8    Antionette Phase II:        Anesthesia Post Evaluation    Patient location during evaluation: PACU  Patient participation: complete - patient participated  Level of consciousness: awake and alert  Airway patency: patent  Nausea & Vomiting: no nausea and no vomiting  Complications: no  Cardiovascular status: blood pressure returned to baseline  Respiratory status: acceptable  Hydration status: euvolemic  Comments: VSS on transfer to phase 2 recovery. No anesthetic complications.

## 2022-11-04 LAB
CALCULI COMPOSITION: NORMAL
MASS: 20 MG
STONE DESCRIPTION: NORMAL

## 2022-11-18 DIAGNOSIS — E78.00 HYPERCHOLESTEROLEMIA: ICD-10-CM

## 2022-11-18 DIAGNOSIS — E78.00 HYPERCHOLESTEROLEMIA: Primary | ICD-10-CM

## 2022-11-18 LAB
ALBUMIN SERPL-MCNC: 5 G/DL (ref 3.4–5)
ALP BLD-CCNC: 78 U/L (ref 40–129)
ALT SERPL-CCNC: 25 U/L (ref 10–40)
AST SERPL-CCNC: 24 U/L (ref 15–37)
BILIRUB SERPL-MCNC: 0.5 MG/DL (ref 0–1)
BILIRUBIN DIRECT: <0.2 MG/DL (ref 0–0.3)
BILIRUBIN, INDIRECT: NORMAL MG/DL (ref 0–1)
CHOLESTEROL, FASTING: 264 MG/DL (ref 0–199)
HDLC SERPL-MCNC: 55 MG/DL (ref 40–60)
LDL CHOLESTEROL CALCULATED: 188 MG/DL
TOTAL PROTEIN: 8 G/DL (ref 6.4–8.2)
TRIGLYCERIDE, FASTING: 104 MG/DL (ref 0–150)
VLDLC SERPL CALC-MCNC: 21 MG/DL

## 2022-11-21 ENCOUNTER — OFFICE VISIT (OUTPATIENT)
Dept: INTERNAL MEDICINE CLINIC | Age: 42
End: 2022-11-21

## 2022-11-21 VITALS
HEIGHT: 72 IN | WEIGHT: 204 LBS | DIASTOLIC BLOOD PRESSURE: 75 MMHG | RESPIRATION RATE: 18 BRPM | BODY MASS INDEX: 27.63 KG/M2 | SYSTOLIC BLOOD PRESSURE: 105 MMHG | HEART RATE: 70 BPM

## 2022-11-21 DIAGNOSIS — E78.00 HYPERCHOLESTEROLEMIA: Primary | ICD-10-CM

## 2022-11-21 PROCEDURE — 99212 OFFICE O/P EST SF 10 MIN: CPT | Performed by: INTERNAL MEDICINE

## 2022-11-21 RX ORDER — ATORVASTATIN CALCIUM 20 MG/1
20 TABLET, FILM COATED ORAL DAILY
Qty: 90 TABLET | Refills: 1 | Status: SHIPPED | OUTPATIENT
Start: 2022-11-21

## 2022-11-21 ASSESSMENT — ENCOUNTER SYMPTOMS
SORE THROAT: 0
CONSTIPATION: 0
NAUSEA: 0
STRIDOR: 0
COUGH: 0
SHORTNESS OF BREATH: 0
EYE PAIN: 0
ABDOMINAL PAIN: 0
DIARRHEA: 1

## 2022-11-21 NOTE — PROGRESS NOTES
Subjective:      Patient ID: Maynor Espinosa is a 43 y.o. male. HPI  43 y.o. male here for  cholesterol workup     Since last seen pt has developed right kidney stone and went to ER and was admitted to [facility] with cysto and stone removal and stent placement  Issues resolved and stent removed now. 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 Not related to any diet. Gets some abd pain with cramping followed by loose BM and feels unwell all day. These might happen sporadically. Takes lomotil with good relief. Symptoms >15 yrs . Never seen GI for this    No weight loss or BRPR. No skin rash or arthritis      Family hx  - father with CAD    Non smoker, lives with wife and kids        Allergies   Allergen Reactions    Codeine Rash and Hives       Review of Systems   Constitutional:  Negative for diaphoresis and fever. HENT:  Negative for congestion, hearing loss, nosebleeds, sore throat and tinnitus. Eyes:  Negative for pain. Respiratory:  Negative for cough, shortness of breath and stridor. Cardiovascular:  Negative for chest pain and palpitations. Gastrointestinal:  Positive for diarrhea. Negative for abdominal pain, constipation and nausea. Endocrine: Negative for cold intolerance and heat intolerance. Genitourinary:  Negative for dysuria and frequency. Musculoskeletal:  Negative for arthralgias, myalgias and neck pain. Skin:  Negative for rash. Allergic/Immunologic: Negative for environmental allergies. Neurological:  Negative for tremors and headaches. Psychiatric/Behavioral:  The patient is not nervous/anxious. All other systems reviewed and are negative.     Objective:   Physical Exam   Vitals:    11/21/22 0821   BP: 105/75   Pulse: 70   Resp: 18         General: young healthy male,  Awake, alert and oriented. Appears to be not in any distress  Mucous Membranes:  Pink , anicteric  Mild hearing issues - left hearing aid  HEENT - MM clear. No Submandibular LN palpable  Neck: No JVD, no carotid bruit, no thyromegaly  Chest:  Clear to auscultation bilaterally, no added sounds  Cardiovascular:  RRR S1S2 heard, no murmurs or gallops  Abdomen:  Soft, undistended, non tender, no organomegaly, BS present  Extremities: No edema or cyanosis. Distal pulses well felt  Penis and scrotum wnl  Neurological : grossly normal CN 2 to 12 intact- hearing def noted  Gait steady    Wt Readings from Last 3 Encounters:   11/21/22 204 lb (92.5 kg)   11/01/22 206 lb 5.6 oz (93.6 kg)   10/27/22 204 lb (92.5 kg)         Assessment:       Diagnosis Orders   1. Hypercholesterolemia                  Plan:      Recent labs reviewed with pt, high cholesterol despite diet noted   need meds and good control  given famly hx of CAD  Recommend to start statins given to him  Repeat in 6 months     Chronic diarrhea - stool tests and Ct abd neg long time ago. Usually improves with lomotil  Consider GI consult.  But no warning signs or evidence of systemic disease so far      Hearing loss - has hearing aids      covid vaccine recommended    weight loss and life style modification along with dietary changes, increased physical activity encouraged            Priscila Gaytan MD

## 2023-09-29 RX ORDER — TAMSULOSIN HYDROCHLORIDE 0.4 MG/1
0.4 CAPSULE ORAL DAILY
Qty: 30 CAPSULE | Refills: 0 | Status: SHIPPED | OUTPATIENT
Start: 2023-09-29 | End: 2023-10-29

## 2023-10-01 ASSESSMENT — PATIENT HEALTH QUESTIONNAIRE - PHQ9
2. FEELING DOWN, DEPRESSED OR HOPELESS: NOT AT ALL
SUM OF ALL RESPONSES TO PHQ9 QUESTIONS 1 & 2: 0
SUM OF ALL RESPONSES TO PHQ9 QUESTIONS 1 & 2: 0
SUM OF ALL RESPONSES TO PHQ QUESTIONS 1-9: 0
1. LITTLE INTEREST OR PLEASURE IN DOING THINGS: 0
SUM OF ALL RESPONSES TO PHQ QUESTIONS 1-9: 0
2. FEELING DOWN, DEPRESSED OR HOPELESS: 0
1. LITTLE INTEREST OR PLEASURE IN DOING THINGS: NOT AT ALL

## 2023-10-02 ENCOUNTER — HOSPITAL ENCOUNTER (OUTPATIENT)
Dept: GENERAL RADIOLOGY | Age: 43
Discharge: HOME | End: 2023-10-02
Payer: COMMERCIAL

## 2023-10-02 ENCOUNTER — OFFICE VISIT (OUTPATIENT)
Dept: INTERNAL MEDICINE CLINIC | Age: 43
End: 2023-10-02

## 2023-10-02 ENCOUNTER — HOSPITAL ENCOUNTER (OUTPATIENT)
Age: 43
Discharge: HOME | End: 2023-10-02
Payer: COMMERCIAL

## 2023-10-02 VITALS
BODY MASS INDEX: 28.31 KG/M2 | RESPIRATION RATE: 14 BRPM | WEIGHT: 209 LBS | SYSTOLIC BLOOD PRESSURE: 142 MMHG | HEART RATE: 68 BPM | HEIGHT: 72 IN | DIASTOLIC BLOOD PRESSURE: 94 MMHG

## 2023-10-02 DIAGNOSIS — R10.9 LEFT FLANK PAIN: Primary | ICD-10-CM

## 2023-10-02 DIAGNOSIS — R10.9 LEFT FLANK PAIN: ICD-10-CM

## 2023-10-02 LAB
BILIRUBIN, POC: NORMAL
BLOOD URINE, POC: NORMAL
CLARITY, POC: NORMAL
COLOR, POC: NORMAL
GLUCOSE URINE, POC: NORMAL
KETONES, POC: NORMAL
LEUKOCYTE EST, POC: NORMAL
NITRITE, POC: NORMAL
PH, POC: NORMAL
PROTEIN, POC: NORMAL
SPECIFIC GRAVITY, POC: NORMAL
UROBILINOGEN, POC: NORMAL

## 2023-10-02 PROCEDURE — 74019 RADEX ABDOMEN 2 VIEWS: CPT

## 2023-10-02 PROCEDURE — 99212 OFFICE O/P EST SF 10 MIN: CPT | Performed by: NURSE PRACTITIONER

## 2023-10-02 PROCEDURE — 81002 URINALYSIS NONAUTO W/O SCOPE: CPT | Performed by: NURSE PRACTITIONER

## 2023-10-02 RX ORDER — OXYCODONE HYDROCHLORIDE 5 MG/1
5 TABLET ORAL EVERY 6 HOURS PRN
Qty: 12 TABLET | Refills: 0 | Status: SHIPPED | OUTPATIENT
Start: 2023-10-02 | End: 2023-10-05

## 2023-10-05 DIAGNOSIS — R10.9 LEFT FLANK PAIN: Primary | ICD-10-CM

## 2024-01-09 NOTE — OP NOTE
[facility]                 [facility]                                 OPERATIVE REPORT    PATIENT NAME: Maya Shannon                 :        1980  MED REC NO:   [id number]                          ROOM:       0629  ACCOUNT NO:   [de-identified]                           ADMIT DATE: 10/31/2022  PROVIDER:     Toño Martinez. Penny Hernandez MD        DATE OF PROCEDURE:  2022    LOCATION:  [place] [address] DIAGNOSIS:  Right ureteral stone. POSTOPERATIVE DIAGNOSIS:  Right ureteral stone. PROCEDURE PERFORMED:  Right ureteroscopy, stone manipulation and stone  extraction, and right ureteral stent placement. SURGEON:  Toño Martinez. Penny Hernandez MD    INDICATIONS FOR THE PROCEDURE:  The patient is a 45-year-old male, who  has right flank pain. CAT scan showing a 3-mm right mid ureteral stone. The risks and benefits of right ureteroscopy and the indicated  procedures were discussed with the patient and he agreed to proceed. DESCRIPTION OF THE PROCEDURE:  The patient had preoperative antibiotics  and general anesthesia, was in the lithotomy position. His genitalia  were prepped and draped in the usual sterile fashion. A 21-[place]i rigid  cystoscope was inserted into the patient's urethra. I looked into the  patient's bladder. I found the right ureteric orifice and placed a  ZIPwire up under fluoroscopic guidance into the right kidney. I then  took a rigid ureteroscope into the mid ureter and saw the stone. I took  a ZeroTip nitinol basket and I was able to grasp the stone and  manipulated and navigated all the way out and sent it off for pathologic  analysis. I then looked all the way up to the UPJ. I did not see any  other stones. I put a cystoscope in and put a 4.8-[place]i 26-cm double-J  ureteral stent. I removed the wire and the stent coiled up in the  kidney and down in the bladder.   The string was affixed with benzoin and  Steri-Strips and he was awoken and sent to the recovery room. He  tolerated the procedure well. ESTIMATED BLOOD LOSS:  0 mL. PLAN:  He has to pull the string to pull out the stent in 4 to 5 days  and I will see him back in the office in one month with the renal  ultrasound.         Tyrone Rider MD    D: 11/01/2022 12:27:32       T: 11/01/2022 14:17:56     [id number]  Job#: [id number]     Doc#: [id number]    CC: Yes

## 2024-06-28 ENCOUNTER — APPOINTMENT (OUTPATIENT)
Dept: GENERAL RADIOLOGY | Age: 44
End: 2024-06-28
Payer: COMMERCIAL

## 2024-06-28 ENCOUNTER — HOSPITAL ENCOUNTER (EMERGENCY)
Age: 44
Discharge: HOME | End: 2024-06-28
Attending: EMERGENCY MEDICINE
Payer: COMMERCIAL

## 2024-06-28 VITALS
HEART RATE: 78 BPM | DIASTOLIC BLOOD PRESSURE: 77 MMHG | OXYGEN SATURATION: 97 % | HEIGHT: 72 IN | RESPIRATION RATE: 14 BRPM | TEMPERATURE: 99.4 F | WEIGHT: 220 LBS | BODY MASS INDEX: 29.8 KG/M2 | SYSTOLIC BLOOD PRESSURE: 119 MMHG

## 2024-06-28 DIAGNOSIS — R74.01 TRANSAMINITIS: ICD-10-CM

## 2024-06-28 DIAGNOSIS — R03.0 ELEVATED BLOOD PRESSURE READING: ICD-10-CM

## 2024-06-28 DIAGNOSIS — M89.8X9 BONE ISLAND: ICD-10-CM

## 2024-06-28 DIAGNOSIS — R19.7 DIARRHEA, UNSPECIFIED TYPE: ICD-10-CM

## 2024-06-28 DIAGNOSIS — R53.1 GENERALIZED WEAKNESS: ICD-10-CM

## 2024-06-28 DIAGNOSIS — R07.9 CHEST PAIN, UNSPECIFIED TYPE: Primary | ICD-10-CM

## 2024-06-28 LAB
ALBUMIN SERPL-MCNC: 4.7 G/DL (ref 3.4–5)
ALBUMIN/GLOB SERPL: 1.1 {RATIO} (ref 1.1–2.2)
ALP SERPL-CCNC: 87 U/L (ref 40–129)
ALT SERPL-CCNC: 65 U/L (ref 10–40)
ANION GAP SERPL CALCULATED.3IONS-SCNC: 13 MMOL/L (ref 3–16)
AST SERPL-CCNC: 47 U/L (ref 15–37)
BASOPHILS # BLD: 0 K/UL (ref 0–0.2)
BASOPHILS NFR BLD: 0.5 %
BILIRUB SERPL-MCNC: 0.7 MG/DL (ref 0–1)
BUN SERPL-MCNC: 10 MG/DL (ref 7–20)
C DIFF TOX A+B STL QL IA: NORMAL
CALCIUM SERPL-MCNC: 9.6 MG/DL (ref 8.3–10.6)
CHLORIDE SERPL-SCNC: 97 MMOL/L (ref 99–110)
CK SERPL-CCNC: 121 U/L (ref 39–308)
CO2 SERPL-SCNC: 25 MMOL/L (ref 21–32)
CREAT SERPL-MCNC: 1.1 MG/DL (ref 0.9–1.3)
DEPRECATED RDW RBC AUTO: 13.5 % (ref 12.4–15.4)
EKG ATRIAL RATE: 67 BPM
EKG DIAGNOSIS: NORMAL
EKG P AXIS: 49 DEGREES
EKG P-R INTERVAL: 150 MS
EKG Q-T INTERVAL: 420 MS
EKG QRS DURATION: 84 MS
EKG QTC CALCULATION (BAZETT): 443 MS
EKG R AXIS: -7 DEGREES
EKG T AXIS: 3 DEGREES
EKG VENTRICULAR RATE: 67 BPM
EOSINOPHIL # BLD: 0.1 K/UL (ref 0–0.6)
EOSINOPHIL NFR BLD: 0.9 %
FLUAV RNA RESP QL NAA+PROBE: NOT DETECTED
FLUBV RNA RESP QL NAA+PROBE: NOT DETECTED
GFR SERPLBLD CREATININE-BSD FMLA CKD-EPI: 85 ML/MIN/{1.73_M2}
GLUCOSE SERPL-MCNC: 116 MG/DL (ref 70–99)
HCT VFR BLD AUTO: 50 % (ref 40.5–52.5)
HGB BLD-MCNC: 16.8 G/DL (ref 13.5–17.5)
LACTATE BLDV-SCNC: 1.2 MMOL/L (ref 0.4–2)
LIPASE SERPL-CCNC: 31 U/L (ref 13–60)
LYMPHOCYTES # BLD: 1.1 K/UL (ref 1–5.1)
LYMPHOCYTES NFR BLD: 13.9 %
MAGNESIUM SERPL-MCNC: 2 MG/DL (ref 1.8–2.4)
MCH RBC QN AUTO: 30.7 PG (ref 26–34)
MCHC RBC AUTO-ENTMCNC: 33.6 G/DL (ref 31–36)
MCV RBC AUTO: 91.3 FL (ref 80–100)
MONOCYTES # BLD: 1.1 K/UL (ref 0–1.3)
MONOCYTES NFR BLD: 14.5 %
NEUTROPHILS # BLD: 5.4 K/UL (ref 1.7–7.7)
NEUTROPHILS NFR BLD: 70.2 %
PLATELET # BLD AUTO: 227 K/UL (ref 135–450)
PMV BLD AUTO: 8.8 FL (ref 5–10.5)
POTASSIUM SERPL-SCNC: 3.9 MMOL/L (ref 3.5–5.1)
PROT SERPL-MCNC: 8.9 G/DL (ref 6.4–8.2)
RBC # BLD AUTO: 5.47 M/UL (ref 4.2–5.9)
SARS-COV-2 RNA RESP QL NAA+PROBE: NOT DETECTED
SODIUM SERPL-SCNC: 135 MMOL/L (ref 136–145)
TROPONIN, HIGH SENSITIVITY: 6 NG/L (ref 0–22)
TROPONIN, HIGH SENSITIVITY: 7 NG/L (ref 0–22)
TROPONIN, HIGH SENSITIVITY: <6 NG/L (ref 0–22)
WBC # BLD AUTO: 7.8 K/UL (ref 4–11)

## 2024-06-28 PROCEDURE — 87336 ENTAMOEB HIST DISPR AG IA: CPT

## 2024-06-28 PROCEDURE — 93005 ELECTROCARDIOGRAM TRACING: CPT | Performed by: EMERGENCY MEDICINE

## 2024-06-28 PROCEDURE — 85025 COMPLETE CBC W/AUTO DIFF WBC: CPT

## 2024-06-28 PROCEDURE — 80053 COMPREHEN METABOLIC PANEL: CPT

## 2024-06-28 PROCEDURE — 87449 NOS EACH ORGANISM AG IA: CPT

## 2024-06-28 PROCEDURE — 87506 IADNA-DNA/RNA PROBE TQ 6-11: CPT

## 2024-06-28 PROCEDURE — 83735 ASSAY OF MAGNESIUM: CPT

## 2024-06-28 PROCEDURE — 93010 ELECTROCARDIOGRAM REPORT: CPT | Performed by: INTERNAL MEDICINE

## 2024-06-28 PROCEDURE — 87324 CLOSTRIDIUM AG IA: CPT

## 2024-06-28 PROCEDURE — 83605 ASSAY OF LACTIC ACID: CPT

## 2024-06-28 PROCEDURE — 2580000003 HC RX 258: Performed by: EMERGENCY MEDICINE

## 2024-06-28 PROCEDURE — 84484 ASSAY OF TROPONIN QUANT: CPT

## 2024-06-28 PROCEDURE — 87636 SARSCOV2 & INF A&B AMP PRB: CPT

## 2024-06-28 PROCEDURE — 99285 EMERGENCY DEPT VISIT HI MDM: CPT

## 2024-06-28 PROCEDURE — 6370000000 HC RX 637 (ALT 250 FOR IP): Performed by: EMERGENCY MEDICINE

## 2024-06-28 PROCEDURE — 87328 CRYPTOSPORIDIUM AG IA: CPT

## 2024-06-28 PROCEDURE — 83690 ASSAY OF LIPASE: CPT

## 2024-06-28 PROCEDURE — 71045 X-RAY EXAM CHEST 1 VIEW: CPT

## 2024-06-28 PROCEDURE — 82550 ASSAY OF CK (CPK): CPT

## 2024-06-28 RX ORDER — ONDANSETRON 4 MG/1
4 TABLET, ORALLY DISINTEGRATING ORAL ONCE
Status: COMPLETED | OUTPATIENT
Start: 2024-06-28 | End: 2024-06-28

## 2024-06-28 RX ORDER — ACETAMINOPHEN 325 MG/1
650 TABLET ORAL ONCE
Status: COMPLETED | OUTPATIENT
Start: 2024-06-28 | End: 2024-06-28

## 2024-06-28 RX ORDER — FAMOTIDINE 20 MG/1
20 TABLET, FILM COATED ORAL ONCE
Status: COMPLETED | OUTPATIENT
Start: 2024-06-28 | End: 2024-06-28

## 2024-06-28 RX ORDER — 0.9 % SODIUM CHLORIDE 0.9 %
1000 INTRAVENOUS SOLUTION INTRAVENOUS ONCE
Status: COMPLETED | OUTPATIENT
Start: 2024-06-28 | End: 2024-06-28

## 2024-06-28 RX ORDER — ONDANSETRON 4 MG/1
4 TABLET, ORALLY DISINTEGRATING ORAL 3 TIMES DAILY PRN
Qty: 21 TABLET | Refills: 0 | Status: SHIPPED | OUTPATIENT
Start: 2024-06-28

## 2024-06-28 RX ADMIN — FAMOTIDINE 20 MG: 20 TABLET, FILM COATED ORAL at 07:21

## 2024-06-28 RX ADMIN — ACETAMINOPHEN 650 MG: 325 TABLET ORAL at 07:21

## 2024-06-28 RX ADMIN — ONDANSETRON 4 MG: 4 TABLET, ORALLY DISINTEGRATING ORAL at 07:21

## 2024-06-28 RX ADMIN — SODIUM CHLORIDE 1000 ML: 9 INJECTION, SOLUTION INTRAVENOUS at 07:20

## 2024-06-28 ASSESSMENT — ENCOUNTER SYMPTOMS
COUGH: 0
ABDOMINAL PAIN: 0
SHORTNESS OF BREATH: 0
DIARRHEA: 1
CONSTIPATION: 0
CHEST TIGHTNESS: 0
VOMITING: 0
BLOOD IN STOOL: 0
BACK PAIN: 0
NAUSEA: 1
ANAL BLEEDING: 1

## 2024-06-28 ASSESSMENT — HEART SCORE: ECG: NON-SPECIFC REPOLARIZATION DISTURBANCE/LBTB/PM

## 2024-06-28 ASSESSMENT — PAIN - FUNCTIONAL ASSESSMENT
PAIN_FUNCTIONAL_ASSESSMENT: NONE - DENIES PAIN
PAIN_FUNCTIONAL_ASSESSMENT: 0-10
PAIN_FUNCTIONAL_ASSESSMENT: NONE - DENIES PAIN
PAIN_FUNCTIONAL_ASSESSMENT: NONE - DENIES PAIN

## 2024-06-28 ASSESSMENT — PAIN SCALES - GENERAL: PAINLEVEL_OUTOF10: 0

## 2024-06-28 ASSESSMENT — LIFESTYLE VARIABLES
HOW OFTEN DO YOU HAVE A DRINK CONTAINING ALCOHOL: NEVER
HOW MANY STANDARD DRINKS CONTAINING ALCOHOL DO YOU HAVE ON A TYPICAL DAY: PATIENT DOES NOT DRINK

## 2024-06-28 NOTE — DISCHARGE INSTRUCTIONS
Take Tylenol or ibuprofen as needed for pain.  Stay hydrated.    You can take an occasional Imodium as needed for diarrhea.  Follow the instructions on the box.    Return to the emergency department for any worsening diarrhea associated with lightheadedness with passing out, persistent chest pain with shortness of breath, or any other concerns.    Follow-up with your primary doctor or gastroenterology over the next week for any other concerns to ensure the diarrhea improves.

## 2024-06-28 NOTE — ED PROVIDER NOTES
this to his problem list and primary doctor can follow-up on this as well although this is most likely a benign incidental finding but wanted to make sure PCP was not worried about this.  I did not mention this to the patient while he was in the hospital.  No arm complaints today.  Again, I did add this to his diagnoses so primary doctor can follow-up on this.  I also sent a message to his primary doctor for follow-up about this.        I was the primary provider for the patient.      The patient tolerated their visit well.   The patient and / or the family were informed of the results of any tests, a time was given to answer questions.    FINAL IMPRESSION      1. Chest pain, unspecified type    2. Elevated blood pressure reading    3. Diarrhea, unspecified type    4. Generalized weakness    5. Transaminitis    6. Bone island          DISPOSITION/PLAN   DISPOSITION Decision To Discharge 06/28/2024 10:24:10 AM      PATIENT REFERRED TO:  [facility]  ED  [address]  [facility] [id number] [phone]  Go to   If symptoms worsen    Ty Roldan MD  [address]  [address]  [facility] [id number] [phone]    In 3 days  For any other concerns    Miryam Chou MD  [address]  [address]  [facility] [address] [phone]    Call   As needed - GI      DISCHARGEMEDICATIONS:  Discharge Medication List as of 6/28/2024 10:43 AM        START taking these medications    Details   ondansetron (ZOFRAN-ODT) 4 MG disintegrating tablet Take 1 tablet by mouth 3 times daily as needed for Nausea or Vomiting, Disp-21 tablet, R-0Print             DISCONTINUED MEDICATIONS:  Discharge Medication List as of 6/28/2024 10:43 AM                 (Please note that portions of this note were completed with a voicerecognition program.  Efforts were made to edit the dictations but occasionally words are mis-transcribed.)    Melquiades Bloom MD (electronically signed)            Melquiades Bloom,

## 2024-06-29 LAB — GI PATHOGENS PNL STL NAA+PROBE: NORMAL

## 2024-06-30 LAB
CRYPTOSP AG STL QL IA: NORMAL
E HISTOLYT AG STL QL IA: NORMAL
G LAMBLIA AG STL QL IA: NORMAL

## 2025-02-24 ENCOUNTER — OFFICE VISIT (OUTPATIENT)
Dept: INTERNAL MEDICINE CLINIC | Age: 45
End: 2025-02-24

## 2025-02-24 VITALS
RESPIRATION RATE: 14 BRPM | SYSTOLIC BLOOD PRESSURE: 136 MMHG | DIASTOLIC BLOOD PRESSURE: 70 MMHG | HEART RATE: 68 BPM | HEIGHT: 72 IN | WEIGHT: 214 LBS | BODY MASS INDEX: 28.99 KG/M2

## 2025-02-24 DIAGNOSIS — J40 BRONCHITIS: Primary | ICD-10-CM

## 2025-02-24 PROCEDURE — 99213 OFFICE O/P EST LOW 20 MIN: CPT | Performed by: NURSE PRACTITIONER

## 2025-02-24 RX ORDER — PREDNISONE 20 MG/1
20 TABLET ORAL DAILY
Qty: 5 TABLET | Refills: 0 | Status: SHIPPED | OUTPATIENT
Start: 2025-02-24 | End: 2025-03-01

## 2025-02-24 RX ORDER — AZITHROMYCIN 250 MG/1
TABLET, FILM COATED ORAL
Qty: 6 TABLET | Refills: 0 | Status: SHIPPED | OUTPATIENT
Start: 2025-02-24 | End: 2025-03-06

## 2025-02-24 ASSESSMENT — PATIENT HEALTH QUESTIONNAIRE - PHQ9
2. FEELING DOWN, DEPRESSED OR HOPELESS: NOT AT ALL
SUM OF ALL RESPONSES TO PHQ QUESTIONS 1-9: 0
SUM OF ALL RESPONSES TO PHQ9 QUESTIONS 1 & 2: 0
SUM OF ALL RESPONSES TO PHQ QUESTIONS 1-9: 0
1. LITTLE INTEREST OR PLEASURE IN DOING THINGS: NOT AT ALL
SUM OF ALL RESPONSES TO PHQ QUESTIONS 1-9: 0
SUM OF ALL RESPONSES TO PHQ QUESTIONS 1-9: 0

## 2025-02-24 ASSESSMENT — ENCOUNTER SYMPTOMS
SHORTNESS OF BREATH: 0
COUGH: 1
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
## (undated) DEVICE — SINGLE ACTION PUMPING SYSTEM

## (undated) DEVICE — SYRINGE MED 10ML LUERLOCK TIP W/O SFTY DISP

## (undated) DEVICE — SOLUTION IRRIG 2000ML STRL H2O UROMATIC PLAS CONT USP

## (undated) DEVICE — NITINOL STONE RETRIEVAL BASKET: Brand: ZERO TIP

## (undated) DEVICE — GUIDEWIRE ENDO L150CM DIA0.035IN STR TIP

## (undated) DEVICE — BAG DRNGE COMB PK

## (undated) DEVICE — GLOVE ORANGE PI 7 1/2   MSG9075

## (undated) DEVICE — CYSTO: Brand: MEDLINE INDUSTRIES, INC.